# Patient Record
Sex: MALE | Race: WHITE | NOT HISPANIC OR LATINO
[De-identification: names, ages, dates, MRNs, and addresses within clinical notes are randomized per-mention and may not be internally consistent; named-entity substitution may affect disease eponyms.]

---

## 2017-03-06 ENCOUNTER — APPOINTMENT (OUTPATIENT)
Dept: NEUROLOGY | Facility: CLINIC | Age: 57
End: 2017-03-06

## 2017-05-30 ENCOUNTER — APPOINTMENT (OUTPATIENT)
Dept: NEUROLOGY | Facility: CLINIC | Age: 57
End: 2017-05-30

## 2017-05-30 DIAGNOSIS — M54.12 RADICULOPATHY, CERVICAL REGION: ICD-10-CM

## 2017-05-30 DIAGNOSIS — N40.0 BENIGN PROSTATIC HYPERPLASIA WITHOUT LOWER URINARY TRACT SYMPMS: ICD-10-CM

## 2017-05-30 DIAGNOSIS — Z78.9 OTHER SPECIFIED HEALTH STATUS: ICD-10-CM

## 2017-05-30 DIAGNOSIS — Z87.891 PERSONAL HISTORY OF NICOTINE DEPENDENCE: ICD-10-CM

## 2017-06-01 VITALS — DIASTOLIC BLOOD PRESSURE: 78 MMHG | SYSTOLIC BLOOD PRESSURE: 122 MMHG | HEART RATE: 70 BPM

## 2017-06-01 PROBLEM — Z78.9 CURRENT NON-SMOKER: Status: ACTIVE | Noted: 2017-06-01

## 2017-06-01 PROBLEM — N40.0 BENIGN PROSTATIC HYPERPLASIA: Status: RESOLVED | Noted: 2017-06-01 | Resolved: 2017-06-01

## 2017-06-01 PROBLEM — Z87.891 FORMER SMOKER: Status: ACTIVE | Noted: 2017-06-01

## 2017-06-05 ENCOUNTER — FORM ENCOUNTER (OUTPATIENT)
Age: 57
End: 2017-06-05

## 2017-06-06 ENCOUNTER — APPOINTMENT (OUTPATIENT)
Dept: MRI IMAGING | Facility: CLINIC | Age: 57
End: 2017-06-06

## 2017-06-06 ENCOUNTER — OUTPATIENT (OUTPATIENT)
Dept: OUTPATIENT SERVICES | Facility: HOSPITAL | Age: 57
LOS: 1 days | End: 2017-06-06
Payer: COMMERCIAL

## 2017-06-06 DIAGNOSIS — Z00.8 ENCOUNTER FOR OTHER GENERAL EXAMINATION: ICD-10-CM

## 2017-06-06 PROCEDURE — 72141 MRI NECK SPINE W/O DYE: CPT

## 2017-06-26 ENCOUNTER — APPOINTMENT (OUTPATIENT)
Dept: NEUROLOGY | Facility: CLINIC | Age: 57
End: 2017-06-26

## 2017-06-26 VITALS
HEART RATE: 72 BPM | WEIGHT: 176 LBS | BODY MASS INDEX: 26.07 KG/M2 | HEIGHT: 69 IN | SYSTOLIC BLOOD PRESSURE: 132 MMHG | DIASTOLIC BLOOD PRESSURE: 85 MMHG

## 2017-07-19 ENCOUNTER — APPOINTMENT (OUTPATIENT)
Dept: COLORECTAL SURGERY | Facility: CLINIC | Age: 57
End: 2017-07-19

## 2017-07-19 ENCOUNTER — APPOINTMENT (OUTPATIENT)
Dept: SURGERY | Facility: CLINIC | Age: 57
End: 2017-07-19

## 2017-07-19 VITALS
DIASTOLIC BLOOD PRESSURE: 84 MMHG | TEMPERATURE: 98.1 F | WEIGHT: 175 LBS | RESPIRATION RATE: 15 BRPM | HEART RATE: 65 BPM | OXYGEN SATURATION: 98 % | HEIGHT: 69 IN | BODY MASS INDEX: 25.92 KG/M2 | SYSTOLIC BLOOD PRESSURE: 129 MMHG

## 2017-07-19 DIAGNOSIS — N50.819 TESTICULAR PAIN, UNSPECIFIED: ICD-10-CM

## 2017-07-21 ENCOUNTER — APPOINTMENT (OUTPATIENT)
Dept: NEUROLOGY | Facility: CLINIC | Age: 57
End: 2017-07-21

## 2017-07-21 VITALS
SYSTOLIC BLOOD PRESSURE: 118 MMHG | HEART RATE: 68 BPM | HEIGHT: 69 IN | WEIGHT: 175 LBS | DIASTOLIC BLOOD PRESSURE: 82 MMHG | BODY MASS INDEX: 25.92 KG/M2

## 2017-07-21 DIAGNOSIS — E55.9 VITAMIN D DEFICIENCY, UNSPECIFIED: ICD-10-CM

## 2017-07-21 DIAGNOSIS — R42 DIZZINESS AND GIDDINESS: ICD-10-CM

## 2017-07-25 ENCOUNTER — FORM ENCOUNTER (OUTPATIENT)
Age: 57
End: 2017-07-25

## 2017-07-26 ENCOUNTER — APPOINTMENT (OUTPATIENT)
Dept: MRI IMAGING | Facility: CLINIC | Age: 57
End: 2017-07-26

## 2017-07-26 ENCOUNTER — OUTPATIENT (OUTPATIENT)
Dept: OUTPATIENT SERVICES | Facility: HOSPITAL | Age: 57
LOS: 1 days | End: 2017-07-26
Payer: COMMERCIAL

## 2017-07-26 DIAGNOSIS — Z00.8 ENCOUNTER FOR OTHER GENERAL EXAMINATION: ICD-10-CM

## 2017-07-26 PROCEDURE — 70544 MR ANGIOGRAPHY HEAD W/O DYE: CPT

## 2017-07-26 PROCEDURE — 70551 MRI BRAIN STEM W/O DYE: CPT

## 2017-09-26 ENCOUNTER — APPOINTMENT (OUTPATIENT)
Dept: NEUROLOGY | Facility: CLINIC | Age: 57
End: 2017-09-26
Payer: COMMERCIAL

## 2017-09-26 VITALS
SYSTOLIC BLOOD PRESSURE: 120 MMHG | WEIGHT: 174 LBS | HEIGHT: 69 IN | BODY MASS INDEX: 25.77 KG/M2 | DIASTOLIC BLOOD PRESSURE: 78 MMHG

## 2017-09-26 DIAGNOSIS — R20.9 UNSPECIFIED DISTURBANCES OF SKIN SENSATION: ICD-10-CM

## 2017-09-26 DIAGNOSIS — R42 DIZZINESS AND GIDDINESS: ICD-10-CM

## 2017-09-26 DIAGNOSIS — G56.03 CARPAL TUNNEL SYNDROM,BILATERAL UPPER LIMBS: ICD-10-CM

## 2017-09-26 PROCEDURE — 99214 OFFICE O/P EST MOD 30 MIN: CPT

## 2018-09-25 ENCOUNTER — APPOINTMENT (OUTPATIENT)
Dept: COLORECTAL SURGERY | Facility: CLINIC | Age: 58
End: 2018-09-25
Payer: COMMERCIAL

## 2018-09-25 VITALS
HEART RATE: 90 BPM | RESPIRATION RATE: 14 BRPM | HEIGHT: 69 IN | SYSTOLIC BLOOD PRESSURE: 122 MMHG | WEIGHT: 184 LBS | DIASTOLIC BLOOD PRESSURE: 85 MMHG | BODY MASS INDEX: 27.25 KG/M2

## 2018-09-25 DIAGNOSIS — K64.8 OTHER HEMORRHOIDS: ICD-10-CM

## 2018-09-25 DIAGNOSIS — F41.9 ANXIETY DISORDER, UNSPECIFIED: ICD-10-CM

## 2018-09-25 DIAGNOSIS — Z86.010 PERSONAL HISTORY OF COLONIC POLYPS: ICD-10-CM

## 2018-09-25 PROCEDURE — 99243 OFF/OP CNSLTJ NEW/EST LOW 30: CPT

## 2018-10-04 ENCOUNTER — APPOINTMENT (OUTPATIENT)
Dept: COLORECTAL SURGERY | Facility: CLINIC | Age: 58
End: 2018-10-04
Payer: COMMERCIAL

## 2018-10-04 DIAGNOSIS — K63.5 POLYP OF COLON: ICD-10-CM

## 2018-10-04 DIAGNOSIS — K57.90 DIVERTICULOSIS OF INTESTINE, PART UNSPECIFIED, W/OUT PERFORATION OR ABSCESS W/OUT BLEEDING: ICD-10-CM

## 2018-10-04 DIAGNOSIS — K62.5 HEMORRHAGE OF ANUS AND RECTUM: ICD-10-CM

## 2018-10-04 DIAGNOSIS — K64.9 UNSPECIFIED HEMORRHOIDS: ICD-10-CM

## 2018-10-04 PROCEDURE — 45380 COLONOSCOPY AND BIOPSY: CPT

## 2018-10-04 PROCEDURE — 46221 LIGATION OF HEMORRHOID(S): CPT

## 2018-11-08 LAB — CORE LAB BIOPSY: NORMAL

## 2019-07-31 ENCOUNTER — APPOINTMENT (OUTPATIENT)
Dept: COLORECTAL SURGERY | Facility: CLINIC | Age: 59
End: 2019-07-31
Payer: COMMERCIAL

## 2019-07-31 PROCEDURE — 46221 LIGATION OF HEMORRHOID(S): CPT

## 2019-07-31 NOTE — HISTORY OF PRESENT ILLNESS
[FreeTextEntry1] : Pleasant 59-year-old gentleman who is well known to me. I have treated in the past for bleeding internal hemorrhoids with good result. He had a repeat colonoscopy by my associate and performed 2018 and a small benign polyp was removed. He also reports an additional band being placed.\par \par He presented with recurrent, painless intermittent bright red rectal bleeding.\par \par The patient was examined in left lateral decubitus position. An anoscope was introduced and a right anterior hemorrhoid was isolated. This was deployed without difficulty.\par \par I will see him in 3 weeks for second banding
(0) understands/communicates without difficulty

## 2019-08-28 ENCOUNTER — APPOINTMENT (OUTPATIENT)
Dept: COLORECTAL SURGERY | Facility: CLINIC | Age: 59
End: 2019-08-28
Payer: COMMERCIAL

## 2019-08-28 PROCEDURE — 46221 LIGATION OF HEMORRHOID(S): CPT

## 2019-08-28 NOTE — HISTORY OF PRESENT ILLNESS
[FreeTextEntry1] : Patient presents for his second hemorrhoidal banding. He has had no further bleeding since his last banding.\par \par The patient was examined in the left lateral  decubitus position. An anoscope was introduced and a left lateral hemorrhoid isolated. A band  was deployed without difficulty.\par \par I will see him in 3 weeks.

## 2023-07-26 ENCOUNTER — APPOINTMENT (OUTPATIENT)
Dept: COLORECTAL SURGERY | Facility: CLINIC | Age: 63
End: 2023-07-26
Payer: MEDICAID

## 2023-07-26 VITALS
HEIGHT: 69 IN | TEMPERATURE: 97.5 F | HEART RATE: 54 BPM | SYSTOLIC BLOOD PRESSURE: 150 MMHG | WEIGHT: 180 LBS | RESPIRATION RATE: 12 BRPM | DIASTOLIC BLOOD PRESSURE: 80 MMHG | BODY MASS INDEX: 26.66 KG/M2

## 2023-07-26 PROCEDURE — 99203 OFFICE O/P NEW LOW 30 MIN: CPT

## 2023-07-26 RX ORDER — CHROMIUM 200 MCG
TABLET ORAL
Refills: 0 | Status: DISCONTINUED | COMMUNITY
End: 2023-07-26

## 2023-07-26 NOTE — HISTORY OF PRESENT ILLNESS
[FreeTextEntry1] : 64yo WM with hx IH, s/p RBL (last 2019). Last colonoscopy 2018\par \par Approx 2 weeks ago became aware of painless perianal tissue swelling. Denies rectal bleeding. Unable to reduce tissue. Presents for evaluation.\par \par Formed BM 2-3x daily

## 2023-07-26 NOTE — REVIEW OF SYSTEMS
[As Noted in HPI] : as noted in HPI [Negative] : Endocrine [Chest Pain] : no chest pain [Shortness Of Breath] : no shortness of breath [Easy Bleeding] : no tendency for easy bleeding [Easy Bruising] : no tendency for easy bruising [FreeTextEntry9] : recent neck discomfort

## 2023-07-26 NOTE — ASSESSMENT
[FreeTextEntry1] : Very pleasant 63-year-old gentleman who presents with a chief complaint of a perianal lump.\par \par Approximately 2 weeks ago after taking a shower the patient noticed an abnormal lump in the perianal region.  It was not significantly painful and he denies rectal bleeding.  The patient has undergone hemorrhoidal banding in the past for bleeding internal hemorrhoids.  His last colonoscopy was 5 years ago.  As an aside, he had a very large colonic polyp removed colonoscopically in his 30s.\par \par Inspection of the perianal region reveals an obvious thrombosis in the left lateral position.  The skin overlying the thrombosis is intact.  The area is nontender and he denies bleeding.\par \par I explained to him the pathology and this will hopefully simply resolve on its own.  I have advised him to use locally applied over-the-counter steroid creams.  I also explained to him that he is due for colorectal cancer screening and I have asked him to schedule a colonoscopy in 1 to 2 months.

## 2023-07-26 NOTE — PHYSICAL EXAM
[Normal Breath Sounds] : Normal breath sounds [Normal Heart Sounds] : normal heart sounds [Normal Rate and Rhythm] : normal rate and rhythm [No Edema] : No edema [Alert] : alert [Oriented to Person] : oriented to person [Oriented to Place] : oriented to place [Oriented to Time] : oriented to time [Calm] : calm [de-identified] : round soft +BS NT/ND [de-identified] : NC/AT [de-identified] : +ROM [de-identified] : intact

## 2023-09-23 ENCOUNTER — EMERGENCY (EMERGENCY)
Facility: HOSPITAL | Age: 63
LOS: 1 days | Discharge: HOME | End: 2023-09-23
Attending: EMERGENCY MEDICINE
Payer: MEDICAID

## 2023-09-23 VITALS
SYSTOLIC BLOOD PRESSURE: 149 MMHG | HEART RATE: 68 BPM | TEMPERATURE: 98 F | DIASTOLIC BLOOD PRESSURE: 89 MMHG | HEIGHT: 69 IN | RESPIRATION RATE: 16 BRPM | OXYGEN SATURATION: 99 % | WEIGHT: 184.97 LBS

## 2023-09-23 VITALS
DIASTOLIC BLOOD PRESSURE: 86 MMHG | SYSTOLIC BLOOD PRESSURE: 158 MMHG | HEART RATE: 84 BPM | TEMPERATURE: 97 F | RESPIRATION RATE: 18 BRPM | OXYGEN SATURATION: 98 %

## 2023-09-23 LAB
ALBUMIN SERPL ELPH-MCNC: 4.4 G/DL — SIGNIFICANT CHANGE UP (ref 3.3–5)
ALP SERPL-CCNC: 64 U/L — SIGNIFICANT CHANGE UP (ref 40–120)
ALT FLD-CCNC: 39 U/L — SIGNIFICANT CHANGE UP (ref 10–45)
ANION GAP SERPL CALC-SCNC: 16 MMOL/L — SIGNIFICANT CHANGE UP (ref 5–17)
APPEARANCE UR: CLEAR — SIGNIFICANT CHANGE UP
APTT BLD: 26.5 SEC — SIGNIFICANT CHANGE UP (ref 24.5–35.6)
AST SERPL-CCNC: 31 U/L — SIGNIFICANT CHANGE UP (ref 10–40)
BACTERIA # UR AUTO: NEGATIVE — SIGNIFICANT CHANGE UP
BASE EXCESS BLDV CALC-SCNC: -1.2 MMOL/L — SIGNIFICANT CHANGE UP (ref -2–3)
BASE EXCESS BLDV CALC-SCNC: 0.2 MMOL/L — SIGNIFICANT CHANGE UP (ref -2–3)
BASE EXCESS BLDV CALC-SCNC: 0.6 MMOL/L — SIGNIFICANT CHANGE UP (ref -2–3)
BASOPHILS # BLD AUTO: 0.06 K/UL — SIGNIFICANT CHANGE UP (ref 0–0.2)
BASOPHILS NFR BLD AUTO: 0.5 % — SIGNIFICANT CHANGE UP (ref 0–2)
BILIRUB SERPL-MCNC: 0.6 MG/DL — SIGNIFICANT CHANGE UP (ref 0.2–1.2)
BILIRUB UR-MCNC: NEGATIVE — SIGNIFICANT CHANGE UP
BUN SERPL-MCNC: 15 MG/DL — SIGNIFICANT CHANGE UP (ref 7–23)
CA-I SERPL-SCNC: 1.13 MMOL/L — LOW (ref 1.15–1.33)
CA-I SERPL-SCNC: 1.15 MMOL/L — SIGNIFICANT CHANGE UP (ref 1.15–1.33)
CA-I SERPL-SCNC: 1.18 MMOL/L — SIGNIFICANT CHANGE UP (ref 1.15–1.33)
CALCIUM SERPL-MCNC: 9.2 MG/DL — SIGNIFICANT CHANGE UP (ref 8.4–10.5)
CHLORIDE BLDV-SCNC: 103 MMOL/L — SIGNIFICANT CHANGE UP (ref 96–108)
CHLORIDE BLDV-SCNC: 105 MMOL/L — SIGNIFICANT CHANGE UP (ref 96–108)
CHLORIDE BLDV-SCNC: 107 MMOL/L — SIGNIFICANT CHANGE UP (ref 96–108)
CHLORIDE SERPL-SCNC: 104 MMOL/L — SIGNIFICANT CHANGE UP (ref 96–108)
CO2 BLDV-SCNC: 25 MMOL/L — SIGNIFICANT CHANGE UP (ref 22–26)
CO2 BLDV-SCNC: 26 MMOL/L — SIGNIFICANT CHANGE UP (ref 22–26)
CO2 BLDV-SCNC: 29 MMOL/L — HIGH (ref 22–26)
CO2 SERPL-SCNC: 19 MMOL/L — LOW (ref 22–31)
COLOR SPEC: COLORLESS — SIGNIFICANT CHANGE UP
CREAT SERPL-MCNC: 1 MG/DL — SIGNIFICANT CHANGE UP (ref 0.5–1.3)
DIFF PNL FLD: ABNORMAL
EGFR: 86 ML/MIN/1.73M2 — SIGNIFICANT CHANGE UP
EOSINOPHIL # BLD AUTO: 0.05 K/UL — SIGNIFICANT CHANGE UP (ref 0–0.5)
EOSINOPHIL NFR BLD AUTO: 0.4 % — SIGNIFICANT CHANGE UP (ref 0–6)
EPI CELLS # UR: 0 /HPF — SIGNIFICANT CHANGE UP
GAS PNL BLDV: 133 MMOL/L — LOW (ref 136–145)
GAS PNL BLDV: 134 MMOL/L — LOW (ref 136–145)
GAS PNL BLDV: 136 MMOL/L — SIGNIFICANT CHANGE UP (ref 136–145)
GAS PNL BLDV: SIGNIFICANT CHANGE UP
GLUCOSE BLDV-MCNC: 153 MG/DL — HIGH (ref 70–99)
GLUCOSE BLDV-MCNC: 159 MG/DL — HIGH (ref 70–99)
GLUCOSE BLDV-MCNC: 88 MG/DL — SIGNIFICANT CHANGE UP (ref 70–99)
GLUCOSE SERPL-MCNC: 146 MG/DL — HIGH (ref 70–99)
GLUCOSE UR QL: NEGATIVE — SIGNIFICANT CHANGE UP
HCO3 BLDV-SCNC: 24 MMOL/L — SIGNIFICANT CHANGE UP (ref 22–29)
HCO3 BLDV-SCNC: 25 MMOL/L — SIGNIFICANT CHANGE UP (ref 22–29)
HCO3 BLDV-SCNC: 27 MMOL/L — SIGNIFICANT CHANGE UP (ref 22–29)
HCT VFR BLD CALC: 47.6 % — SIGNIFICANT CHANGE UP (ref 39–50)
HCT VFR BLDA CALC: 46 % — SIGNIFICANT CHANGE UP (ref 39–51)
HCT VFR BLDA CALC: 48 % — SIGNIFICANT CHANGE UP (ref 39–51)
HCT VFR BLDA CALC: 48 % — SIGNIFICANT CHANGE UP (ref 39–51)
HGB BLD CALC-MCNC: 15.3 G/DL — SIGNIFICANT CHANGE UP (ref 12.6–17.4)
HGB BLD CALC-MCNC: 15.9 G/DL — SIGNIFICANT CHANGE UP (ref 12.6–17.4)
HGB BLD CALC-MCNC: 16 G/DL — SIGNIFICANT CHANGE UP (ref 12.6–17.4)
HGB BLD-MCNC: 15.9 G/DL — SIGNIFICANT CHANGE UP (ref 13–17)
IMM GRANULOCYTES NFR BLD AUTO: 0.5 % — SIGNIFICANT CHANGE UP (ref 0–0.9)
INR BLD: 0.93 RATIO — SIGNIFICANT CHANGE UP (ref 0.85–1.18)
KETONES UR-MCNC: NEGATIVE — SIGNIFICANT CHANGE UP
LACTATE BLDV-MCNC: 1.8 MMOL/L — SIGNIFICANT CHANGE UP (ref 0.5–2)
LACTATE BLDV-MCNC: 2.7 MMOL/L — HIGH (ref 0.5–2)
LACTATE BLDV-MCNC: 4 MMOL/L — CRITICAL HIGH (ref 0.5–2)
LEUKOCYTE ESTERASE UR-ACNC: NEGATIVE — SIGNIFICANT CHANGE UP
LIDOCAIN IGE QN: 25 U/L — SIGNIFICANT CHANGE UP (ref 7–60)
LYMPHOCYTES # BLD AUTO: 1.03 K/UL — SIGNIFICANT CHANGE UP (ref 1–3.3)
LYMPHOCYTES # BLD AUTO: 8.4 % — LOW (ref 13–44)
MCHC RBC-ENTMCNC: 29.3 PG — SIGNIFICANT CHANGE UP (ref 27–34)
MCHC RBC-ENTMCNC: 33.4 GM/DL — SIGNIFICANT CHANGE UP (ref 32–36)
MCV RBC AUTO: 87.7 FL — SIGNIFICANT CHANGE UP (ref 80–100)
MONOCYTES # BLD AUTO: 0.72 K/UL — SIGNIFICANT CHANGE UP (ref 0–0.9)
MONOCYTES NFR BLD AUTO: 5.9 % — SIGNIFICANT CHANGE UP (ref 2–14)
NEUTROPHILS # BLD AUTO: 10.29 K/UL — HIGH (ref 1.8–7.4)
NEUTROPHILS NFR BLD AUTO: 84.3 % — HIGH (ref 43–77)
NITRITE UR-MCNC: NEGATIVE — SIGNIFICANT CHANGE UP
NRBC # BLD: 0 /100 WBCS — SIGNIFICANT CHANGE UP (ref 0–0)
PCO2 BLDV: 39 MMHG — LOW (ref 42–55)
PCO2 BLDV: 39 MMHG — LOW (ref 42–55)
PCO2 BLDV: 49 MMHG — SIGNIFICANT CHANGE UP (ref 42–55)
PH BLDV: 7.35 — SIGNIFICANT CHANGE UP (ref 7.32–7.43)
PH BLDV: 7.39 — SIGNIFICANT CHANGE UP (ref 7.32–7.43)
PH BLDV: 7.41 — SIGNIFICANT CHANGE UP (ref 7.32–7.43)
PH UR: 8 — SIGNIFICANT CHANGE UP (ref 5–8)
PLATELET # BLD AUTO: 245 K/UL — SIGNIFICANT CHANGE UP (ref 150–400)
PO2 BLDV: 40 MMHG — SIGNIFICANT CHANGE UP (ref 25–45)
PO2 BLDV: 42 MMHG — SIGNIFICANT CHANGE UP (ref 25–45)
PO2 BLDV: 59 MMHG — HIGH (ref 25–45)
POTASSIUM BLDV-SCNC: 4 MMOL/L — SIGNIFICANT CHANGE UP (ref 3.5–5.1)
POTASSIUM BLDV-SCNC: 4 MMOL/L — SIGNIFICANT CHANGE UP (ref 3.5–5.1)
POTASSIUM BLDV-SCNC: 4.1 MMOL/L — SIGNIFICANT CHANGE UP (ref 3.5–5.1)
POTASSIUM SERPL-MCNC: 4.2 MMOL/L — SIGNIFICANT CHANGE UP (ref 3.5–5.3)
POTASSIUM SERPL-SCNC: 4.2 MMOL/L — SIGNIFICANT CHANGE UP (ref 3.5–5.3)
PROT SERPL-MCNC: 7.1 G/DL — SIGNIFICANT CHANGE UP (ref 6–8.3)
PROT UR-MCNC: NEGATIVE — SIGNIFICANT CHANGE UP
PROTHROM AB SERPL-ACNC: 10.3 SEC — SIGNIFICANT CHANGE UP (ref 9.5–13)
RBC # BLD: 5.43 M/UL — SIGNIFICANT CHANGE UP (ref 4.2–5.8)
RBC # FLD: 13 % — SIGNIFICANT CHANGE UP (ref 10.3–14.5)
RBC CASTS # UR COMP ASSIST: 7 /HPF — HIGH (ref 0–4)
SAO2 % BLDV: 68 % — SIGNIFICANT CHANGE UP (ref 67–88)
SAO2 % BLDV: 72.2 % — SIGNIFICANT CHANGE UP (ref 67–88)
SAO2 % BLDV: 92.9 % — HIGH (ref 67–88)
SODIUM SERPL-SCNC: 139 MMOL/L — SIGNIFICANT CHANGE UP (ref 135–145)
SP GR SPEC: 1.04 — HIGH (ref 1.01–1.02)
TROPONIN T, HIGH SENSITIVITY RESULT: <6 NG/L — SIGNIFICANT CHANGE UP (ref 0–51)
UROBILINOGEN FLD QL: NEGATIVE — SIGNIFICANT CHANGE UP
WBC # BLD: 12.21 K/UL — HIGH (ref 3.8–10.5)
WBC # FLD AUTO: 12.21 K/UL — HIGH (ref 3.8–10.5)
WBC UR QL: 0 /HPF — SIGNIFICANT CHANGE UP (ref 0–5)

## 2023-09-23 PROCEDURE — 81001 URINALYSIS AUTO W/SCOPE: CPT

## 2023-09-23 PROCEDURE — 85014 HEMATOCRIT: CPT

## 2023-09-23 PROCEDURE — 75635 CT ANGIO ABDOMINAL ARTERIES: CPT | Mod: MA

## 2023-09-23 PROCEDURE — 82435 ASSAY OF BLOOD CHLORIDE: CPT

## 2023-09-23 PROCEDURE — 85025 COMPLETE CBC W/AUTO DIFF WBC: CPT

## 2023-09-23 PROCEDURE — 85730 THROMBOPLASTIN TIME PARTIAL: CPT

## 2023-09-23 PROCEDURE — 96375 TX/PRO/DX INJ NEW DRUG ADDON: CPT | Mod: XU

## 2023-09-23 PROCEDURE — 99285 EMERGENCY DEPT VISIT HI MDM: CPT | Mod: 25

## 2023-09-23 PROCEDURE — 82330 ASSAY OF CALCIUM: CPT

## 2023-09-23 PROCEDURE — 83605 ASSAY OF LACTIC ACID: CPT

## 2023-09-23 PROCEDURE — 80053 COMPREHEN METABOLIC PANEL: CPT

## 2023-09-23 PROCEDURE — 85610 PROTHROMBIN TIME: CPT

## 2023-09-23 PROCEDURE — 84295 ASSAY OF SERUM SODIUM: CPT

## 2023-09-23 PROCEDURE — 71275 CT ANGIOGRAPHY CHEST: CPT | Mod: 26,MA

## 2023-09-23 PROCEDURE — 93005 ELECTROCARDIOGRAM TRACING: CPT

## 2023-09-23 PROCEDURE — 82803 BLOOD GASES ANY COMBINATION: CPT

## 2023-09-23 PROCEDURE — 87086 URINE CULTURE/COLONY COUNT: CPT

## 2023-09-23 PROCEDURE — 99285 EMERGENCY DEPT VISIT HI MDM: CPT

## 2023-09-23 PROCEDURE — 96365 THER/PROPH/DIAG IV INF INIT: CPT | Mod: XU

## 2023-09-23 PROCEDURE — 84132 ASSAY OF SERUM POTASSIUM: CPT

## 2023-09-23 PROCEDURE — 82947 ASSAY GLUCOSE BLOOD QUANT: CPT

## 2023-09-23 PROCEDURE — 75635 CT ANGIO ABDOMINAL ARTERIES: CPT | Mod: 26,MA

## 2023-09-23 PROCEDURE — 83690 ASSAY OF LIPASE: CPT

## 2023-09-23 PROCEDURE — 96366 THER/PROPH/DIAG IV INF ADDON: CPT

## 2023-09-23 PROCEDURE — 84484 ASSAY OF TROPONIN QUANT: CPT

## 2023-09-23 PROCEDURE — 85018 HEMOGLOBIN: CPT

## 2023-09-23 PROCEDURE — 71275 CT ANGIOGRAPHY CHEST: CPT | Mod: MA

## 2023-09-23 RX ORDER — SODIUM CHLORIDE 9 MG/ML
1000 INJECTION INTRAMUSCULAR; INTRAVENOUS; SUBCUTANEOUS ONCE
Refills: 0 | Status: COMPLETED | OUTPATIENT
Start: 2023-09-23 | End: 2023-09-23

## 2023-09-23 RX ORDER — MORPHINE SULFATE 50 MG/1
4 CAPSULE, EXTENDED RELEASE ORAL ONCE
Refills: 0 | Status: DISCONTINUED | OUTPATIENT
Start: 2023-09-23 | End: 2023-09-23

## 2023-09-23 RX ORDER — TAMSULOSIN HYDROCHLORIDE 0.4 MG/1
0.4 CAPSULE ORAL AT BEDTIME
Refills: 0 | Status: DISCONTINUED | OUTPATIENT
Start: 2023-09-23 | End: 2023-09-27

## 2023-09-23 RX ORDER — ACETAMINOPHEN 500 MG
1000 TABLET ORAL ONCE
Refills: 0 | Status: COMPLETED | OUTPATIENT
Start: 2023-09-23 | End: 2023-09-23

## 2023-09-23 RX ORDER — KETOROLAC TROMETHAMINE 30 MG/ML
15 SYRINGE (ML) INJECTION ONCE
Refills: 0 | Status: DISCONTINUED | OUTPATIENT
Start: 2023-09-23 | End: 2023-09-23

## 2023-09-23 RX ORDER — ACETAMINOPHEN 500 MG
975 TABLET ORAL ONCE
Refills: 0 | Status: COMPLETED | OUTPATIENT
Start: 2023-09-23 | End: 2023-09-23

## 2023-09-23 RX ORDER — TAMSULOSIN HYDROCHLORIDE 0.4 MG/1
1 CAPSULE ORAL
Qty: 30 | Refills: 0
Start: 2023-09-23 | End: 2023-10-22

## 2023-09-23 RX ADMIN — SODIUM CHLORIDE 1000 MILLILITER(S): 9 INJECTION INTRAMUSCULAR; INTRAVENOUS; SUBCUTANEOUS at 20:06

## 2023-09-23 RX ADMIN — Medication 1000 MILLIGRAM(S): at 20:06

## 2023-09-23 RX ADMIN — Medication 400 MILLIGRAM(S): at 13:00

## 2023-09-23 RX ADMIN — Medication 15 MILLIGRAM(S): at 20:20

## 2023-09-23 RX ADMIN — MORPHINE SULFATE 4 MILLIGRAM(S): 50 CAPSULE, EXTENDED RELEASE ORAL at 13:00

## 2023-09-23 RX ADMIN — MORPHINE SULFATE 4 MILLIGRAM(S): 50 CAPSULE, EXTENDED RELEASE ORAL at 20:06

## 2023-09-23 RX ADMIN — TAMSULOSIN HYDROCHLORIDE 0.4 MILLIGRAM(S): 0.4 CAPSULE ORAL at 15:50

## 2023-09-23 RX ADMIN — SODIUM CHLORIDE 1000 MILLILITER(S): 9 INJECTION INTRAMUSCULAR; INTRAVENOUS; SUBCUTANEOUS at 17:49

## 2023-09-23 RX ADMIN — Medication 975 MILLIGRAM(S): at 20:20

## 2023-09-23 RX ADMIN — SODIUM CHLORIDE 1000 MILLILITER(S): 9 INJECTION INTRAMUSCULAR; INTRAVENOUS; SUBCUTANEOUS at 14:30
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
## 2023-09-23 NOTE — ED PROVIDER NOTE - PATIENT PORTAL LINK FT
You can access the FollowMyHealth Patient Portal offered by [facility] by registering at the following website: [email or web address]. By joining [employer]’s FollowMyHealth portal, you will also be able to view your health information using other applications (apps) compatible with our system.

## 2023-09-23 NOTE — ED PROVIDER NOTE - CLINICAL SUMMARY MEDICAL DECISION MAKING FREE TEXT BOX
63-year-old male with past medical history of colon cancer x30 years ago presents emergency department with 2 hours of right abd pain, constant, sharp going to R back, 1 episode of sharp chest pain, diaphoresis, sweating, SOB,  which has resolved. Non tender abd. concern for AD vs nephrolithiasis vs other acute abd pathology. will get labs, CTA's, meds and re-eval.

## 2023-09-23 NOTE — ED PROVIDER NOTE - ATTENDING CONTRIBUTION TO CARE
attending Mara: 63yM nonsmoker h/o colon cancer (remote) p/w 2 hours R flank pain, sharp, sudden onset with associated episode of chest pain. Chest pain since resolved. Denies similar episodes in the past. No h/o kidney stones. Denies fever, vomiting, urinary symptoms. Exam as above. Differential diagnosis including, but not limited to, kidney stone, perforated viscous, aortic dissection, AAA. Will obtain labs, UA/Ucx, CT imaging, pain control, reassess

## 2023-09-23 NOTE — ED ADULT NURSE REASSESSMENT NOTE - NS ED NURSE REASSESS COMMENT FT1
Received report from CHRIS Nevarez RN. Patient presenting with abdominal pain, bladder stone noted on imaging. Patient notes recurrent pain 2/10 in severity after attempting to use restroom. MD Long aware of pain. Patient stable for discharge per MD. Pending discharge.

## 2023-09-23 NOTE — CONSULT NOTE ADULT - SUBJECTIVE AND OBJECTIVE BOX
HPI  63-year-old male with past medical history of colon cancer x30 years ago presents emergency department with 2 hours of right abd pain, constant, sharp going to R back, 1 episode of sharp chest pain, diaphoresis, sweating, SOB,  which has resolved.  Patient denies previous history of cardiac pathology.  Patient states he started cardiologist multiple years ago.  Patient denies previous history of nephrolithiasis. No systemic s/s.    Urology consulted for 2 cm bladder stone. Patient denies dysuria, gross hematuria, flank pain. Sometimes reports incomplete emptying, nocturia x2, weak stream. No other urologic complaints at this time. Tried Flomax approximately 10 years ago but didn't enjoy side effects. Had Urolift procedure in past.    PAST MEDICAL & SURGICAL HISTORY:      MEDICATIONS  (STANDING):  tamsulosin 0.4 milliGRAM(s) Oral at bedtime    MEDICATIONS  (PRN):      FAMILY HISTORY:      Allergies    No Known Allergies    Intolerances        SOCIAL HISTORY:    REVIEW OF SYSTEMS:   Otherwise negative as stated in HPI    Physical Exam  Vital signs  T(C): 36.4 (23 @ 12:08), Max: 36.4 (23 @ 12:08)  HR: 75 (23 @ 14:27)  BP: 157/98 (23 @ 14:27)  SpO2: 98% (23 @ 14:27)  Wt(kg): --    Output      Gen: NAD  Pulm: No respiratory distress  Abd: Soft, nontender, nondistended  : Voiding spontaneously        LABS:       @ 13:08    WBC 12.21 / Hct 47.6  / SCr 1.00         139  |  104  |  15  ----------------------------<  146<H>  4.2   |  19<L>  |  1.00    Ca    9.2      23 Sep 2023 13:08    TPro  7.1  /  Alb  4.4  /  TBili  0.6  /  DBili  x   /  AST  31  /  ALT  39  /  AlkPhos  64      PT/INR - ( 23 Sep 2023 13:08 )   PT: 10.3 sec;   INR: 0.93 ratio         PTT - ( 23 Sep 2023 13:08 )  PTT:26.5 sec  Urinalysis Basic - ( 23 Sep 2023 14:27 )    Color: Colorless / Appearance: Clear / S.043 / pH: x  Gluc: x / Ketone: Negative  / Bili: Negative / Urobili: Negative   Blood: x / Protein: Negative / Nitrite: Negative   Leuk Esterase: Negative / RBC: 7 /hpf / WBC 0 /HPF   Sq Epi: x / Non Sq Epi: x / Bacteria: Negative        Urine Cx: Pending      RADIOLOGY:      ACC: [id number] EXAM: CT ANGIO ABD AOR W RUN(W)AW IC ORDERED BY: PATRICIA PIMENTEL    PROCEDURE DATE: 2023        INTERPRETATION: CLINICAL INFORMATION: CP, abd pain. Evaluate for dissection.    COMPARISON: None.    CONTRAST/COMPLICATIONS:  IV Contrast: IV contrast documented in unlinked concurrent exam  Oral Contrast: NONE  Complications: None reported at time of study completion    PROCEDURE:  CT Angiography of the Chest, Abdomen and Pelvis.  Precontrast imaging was performed through the chest followed by arterial phase imaging of the chest, abdomen and pelvis.  Sagittal and coronal reformats were performed as well as 3D (MIP) reconstructions.    FINDINGS:  CHEST:  LUNGS AND LARGE AIRWAYS: Patent central airways. No pulmonary nodules.  Minimal bibasilar subsegmental atelectasis.  PLEURA: No pleural effusion.  VESSELS: No thoracic aneurysm or dissection. No acute aortic syndrome. Study was not performed or optimized for evaluation of PE.  HEART: Heart size is normal. No pericardial effusion. Coronary artery calcification present. MEDIASTINUM AND PEDRO: No lymphadenopathy.  CHEST WALL AND LOWER NECK: Within normal limits.    ABDOMEN AND PELVIS:  LIVER: Steatosis.  BILE DUCTS: Normal caliber.  GALLBLADDER: Within normal limits.  SPLEEN: Within normal limits.  PANCREAS: Within normal limits.  ADRENALS: Within normal limits.  KIDNEYS/URETERS: Within normal limits.    BLADDER: 2 cm stone present within the dependent portion of urinary bladder.  REPRODUCTIVE ORGANS: Fiducial markers within the prostate gland. Prostate measures 4.4 x 3.5 cm.    Diverticulosis without acute diverticulitis. No colitis.    BOWEL: No bowel obstruction. Appendix is normal.  PERITONEUM: No ascites.  No free air or abscess.  VESSELS: Atherosclerotic changes. No abdominal aortic aneurysm or dissection.  RETROPERITONEUM/LYMPH NODES: No lymphadenopathy.  ABDOMINAL WALL: 1.3 cm fat-containing umbilical hernia.  BONES: Degenerative changes. No lytic or blastic process.    IMPRESSION:  No thoracic/abdominal aortic aortic aneurysm or dissection. No acute aortic syndrome.    Cholelithiasis. No biliary ductal dilatation.    2 cm stone present within the dependent portion of urinary bladder.    Please refer to detailed findings otherwise described above.

## 2023-09-23 NOTE — ED PROVIDER NOTE - OBJECTIVE STATEMENT
63-year-old male with past medical history of colon cancer x30 years ago presents emergency department with 2 hours of right abd pain, constant, sharp going to R back, 1 episode of sharp chest pain, diaphoresis, sweating, SOB,  which has resolved.  Patient denies previous history of cardiac pathology.  Patient states he started cardiologist multiple years ago.  Patient denies previous history of nephrolithiasis. No systemic s/s.

## 2023-09-23 NOTE — ED PROVIDER NOTE - NSFOLLOWUPINSTRUCTIONS_ED_ALL_ED_FT
Kidney Stones    Kidney stones (urolithiasis) are crystal deposits that form inside your kidneys. Pain is caused by the stone moving through the urinary tract, causing spasms of the ureter. Drink enough water and fluids to keep your urine clear or pale yellow. This will help you to pass the stone or stone fragments. If provided a strainer, strain all urine and keep all particulate matter and stones for a follow up appointment with a urologist.    SEEK IMMEDIATE MEDICAL CARE IF YOU HAVE ANY OF THE FOLLOWING SYMPTOMS: pain not controlled with medication, fever/chills, worsening vomiting, inability to urinate, or dizziness/lightheadedness.    please follow up with urology within the week. you stated you will make your own urology appointment.     please take flomax as indicated. you were seen here today for a bladder stone.     while here you had imaging, lab work, urology consult.    SEEK IMMEDIATE MEDICAL CARE IF YOU HAVE ANY OF THE FOLLOWING SYMPTOMS: pain not controlled with medication, fever/chills, worsening vomiting, inability to urinate, or dizziness/lightheadedness.    please follow up with urology within the week. you stated you will make your own urology appointment. you may follow up with Dr. Shellie Allen [place] [facility]  [address] [place] [address]  [phone]    please take flomax as indicated.

## 2023-09-23 NOTE — ED PROVIDER NOTE - PHYSICAL EXAMINATION
PHYSICAL EXAM:  CONSTITUTIONAL: appearing, awake, alert, oriented to person, place, time/situation, pacing, unable to find a position of comfort, pale.   HEAD: Atraumatic  EYES: Clear bilaterally, pupils equal, round and reactive to light.  ENMT: Airway patent, Nasal mucosa clear. Mouth with normal mucosa. Uvula is midline.   CARDIAC: Normal rate, regular rhythm. +S1/S2. No murmurs, rubs or gallops.  RESPIRATORY: Breathing unlabored. Breath sounds clear and equal bilaterally.  ABDOMEN:  Soft, nontender, nondistended. No rebound tenderness or guarding. No CVA tenderness.   NEUROLOGICAL: Alert and oriented, no focal deficits, no motor or sensory deficits. Sensation intact x4 extremities.  SKIN: Skin warm and dry. No evidence of rashes or lesions.

## 2023-09-23 NOTE — CONSULT NOTE ADULT - ASSESSMENT
63M presenting with chest pain and found to have 2 cm bladder stone.    - No acute urologic intervention at this time  - Follow up urine culture  -  mL, which is acceptable given bladder stone and chronic incomplete emptying  - Recommend starting flomax to aid in bladder emptying  - Patient should follow up outpatient with Dr. Shellie Allen [place] [facility]  [address] [place] [id number] [phone]    Case discussed with Dr. Stanford

## 2023-09-24 LAB
CULTURE RESULTS: NO GROWTH — SIGNIFICANT CHANGE UP
SPECIMEN SOURCE: SIGNIFICANT CHANGE UP

## 2023-09-25 ENCOUNTER — APPOINTMENT (OUTPATIENT)
Dept: UROLOGY | Facility: CLINIC | Age: 63
End: 2023-09-25
Payer: MEDICAID

## 2023-09-25 VITALS
HEIGHT: 69 IN | HEART RATE: 73 BPM | DIASTOLIC BLOOD PRESSURE: 92 MMHG | BODY MASS INDEX: 27.99 KG/M2 | WEIGHT: 189 LBS | RESPIRATION RATE: 17 BRPM | TEMPERATURE: 98.1 F | SYSTOLIC BLOOD PRESSURE: 131 MMHG

## 2023-09-25 PROCEDURE — 99205 OFFICE O/P NEW HI 60 MIN: CPT

## 2023-09-25 RX ORDER — ZALEPLON 10 MG/1
10 CAPSULE ORAL
Refills: 0 | Status: ACTIVE | COMMUNITY

## 2023-09-25 RX ORDER — ESCITALOPRAM OXALATE 5 MG/1
5 TABLET ORAL
Refills: 0 | Status: ACTIVE | COMMUNITY

## 2023-09-29 ENCOUNTER — OUTPATIENT (OUTPATIENT)
Dept: OUTPATIENT SERVICES | Facility: HOSPITAL | Age: 63
LOS: 1 days | End: 2023-09-29
Payer: MEDICAID

## 2023-09-29 VITALS
DIASTOLIC BLOOD PRESSURE: 88 MMHG | HEIGHT: 69 IN | SYSTOLIC BLOOD PRESSURE: 132 MMHG | RESPIRATION RATE: 16 BRPM | TEMPERATURE: 97 F | WEIGHT: 186.07 LBS | OXYGEN SATURATION: 97 % | HEART RATE: 76 BPM

## 2023-09-29 DIAGNOSIS — Z98.890 OTHER SPECIFIED POSTPROCEDURAL STATES: Chronic | ICD-10-CM

## 2023-09-29 DIAGNOSIS — N21.0 CALCULUS IN BLADDER: ICD-10-CM

## 2023-09-29 DIAGNOSIS — Z01.818 ENCOUNTER FOR OTHER PREPROCEDURAL EXAMINATION: ICD-10-CM

## 2023-09-29 LAB
ANION GAP SERPL CALC-SCNC: 11 MMOL/L — SIGNIFICANT CHANGE UP (ref 5–17)
BUN SERPL-MCNC: 17 MG/DL — SIGNIFICANT CHANGE UP (ref 7–23)
CALCIUM SERPL-MCNC: 9.4 MG/DL — SIGNIFICANT CHANGE UP (ref 8.4–10.5)
CHLORIDE SERPL-SCNC: 103 MMOL/L — SIGNIFICANT CHANGE UP (ref 96–108)
CO2 SERPL-SCNC: 23 MMOL/L — SIGNIFICANT CHANGE UP (ref 22–31)
CREAT SERPL-MCNC: 0.92 MG/DL — SIGNIFICANT CHANGE UP (ref 0.5–1.3)
EGFR: 95 ML/MIN/1.73M2 — SIGNIFICANT CHANGE UP
GLUCOSE SERPL-MCNC: 88 MG/DL — SIGNIFICANT CHANGE UP (ref 70–99)
HCT VFR BLD CALC: 47 % — SIGNIFICANT CHANGE UP (ref 39–50)
HGB BLD-MCNC: 15.5 G/DL — SIGNIFICANT CHANGE UP (ref 13–17)
MCHC RBC-ENTMCNC: 28.9 PG — SIGNIFICANT CHANGE UP (ref 27–34)
MCHC RBC-ENTMCNC: 33 GM/DL — SIGNIFICANT CHANGE UP (ref 32–36)
MCV RBC AUTO: 87.5 FL — SIGNIFICANT CHANGE UP (ref 80–100)
NRBC # BLD: 0 /100 WBCS — SIGNIFICANT CHANGE UP (ref 0–0)
PLATELET # BLD AUTO: 278 K/UL — SIGNIFICANT CHANGE UP (ref 150–400)
POTASSIUM SERPL-MCNC: 4.2 MMOL/L — SIGNIFICANT CHANGE UP (ref 3.5–5.3)
POTASSIUM SERPL-SCNC: 4.2 MMOL/L — SIGNIFICANT CHANGE UP (ref 3.5–5.3)
RBC # BLD: 5.37 M/UL — SIGNIFICANT CHANGE UP (ref 4.2–5.8)
RBC # FLD: 12.8 % — SIGNIFICANT CHANGE UP (ref 10.3–14.5)
SODIUM SERPL-SCNC: 137 MMOL/L — SIGNIFICANT CHANGE UP (ref 135–145)
WBC # BLD: 5.37 K/UL — SIGNIFICANT CHANGE UP (ref 3.8–10.5)
WBC # FLD AUTO: 5.37 K/UL — SIGNIFICANT CHANGE UP (ref 3.8–10.5)

## 2023-09-29 PROCEDURE — 85027 COMPLETE CBC AUTOMATED: CPT

## 2023-09-29 PROCEDURE — G0463: CPT

## 2023-09-29 PROCEDURE — 36415 COLL VENOUS BLD VENIPUNCTURE: CPT

## 2023-09-29 PROCEDURE — 87086 URINE CULTURE/COLONY COUNT: CPT

## 2023-09-29 PROCEDURE — 80048 BASIC METABOLIC PNL TOTAL CA: CPT

## 2023-09-29 NOTE — H&P PST ADULT - ASSESSMENT
DASI score: 8.6 METS  DASI activity: Exercises every day, uses elliptical.  Loose teeth or denture: No loose teeth or dentures  Mallampati: Class 2

## 2023-09-29 NOTE — H&P PST ADULT - NSICDXPASTMEDICALHX_GEN_ALL_CORE_FT
PAST MEDICAL HISTORY:  Bladder stone     BPH (benign prostatic hyperplasia)     Colon polyps     H/O carpal tunnel syndrome     Internal hemorrhoids     Nephrolithiasis

## 2023-09-29 NOTE — H&P PST ADULT - HISTORY OF PRESENT ILLNESS
64 y/o M with PMHx of Colon Polyps, BPH s/p Urolift procedure (10 years ago), reported symptoms of nocturia and weakened urinary stream, recent visit to [facility] ED on 09.23.2023 for symptoms of lower abdominal pain/spasm, CT demonstrated 2cm stone in urinary bladder. Patient is scheduled for Cystoscopy, Bladder Stone Removal with Dr. Hensley on 10/05/2023. 62 y/o M with PMHx of Colon Polyps, BPH s/p Urolift procedure (10 years ago), Anxiety, reported symptoms of nocturia and weakened urinary stream, recent visit to [facility] ED on 09.23.2023 for symptoms of lower abdominal pain/spasm, CT demonstrated 2cm stone in urinary bladder. Patient is scheduled for Cystoscopy, Bladder Stone Removal with Dr. Hensley on 10/05/2023.

## 2023-09-29 NOTE — H&P PST ADULT - PROBLEM SELECTOR PLAN 1
Scheduled for Cystoscopy, Bladder Stone Removal  Pre-op labs obtained. PST instructions provided. Patient verbalized understanding of instructions.

## 2023-09-29 NOTE — H&P PST ADULT - RESPIRATORY RATE (BREATHS/MIN)
----- Message from Jeff Wagner MD sent at 1/23/2020  8:39 AM EST -----  Tell patient negative Pap   16

## 2023-10-01 LAB
CULTURE RESULTS: NO GROWTH — SIGNIFICANT CHANGE UP
SPECIMEN SOURCE: SIGNIFICANT CHANGE UP

## 2023-10-04 ENCOUNTER — TRANSCRIPTION ENCOUNTER (OUTPATIENT)
Age: 63
End: 2023-10-04

## 2023-10-05 ENCOUNTER — APPOINTMENT (OUTPATIENT)
Dept: UROLOGY | Facility: HOSPITAL | Age: 63
End: 2023-10-05

## 2023-10-05 ENCOUNTER — RESULT REVIEW (OUTPATIENT)
Age: 63
End: 2023-10-05

## 2023-10-05 ENCOUNTER — TRANSCRIPTION ENCOUNTER (OUTPATIENT)
Age: 63
End: 2023-10-05

## 2023-10-05 ENCOUNTER — OUTPATIENT (OUTPATIENT)
Dept: INPATIENT UNIT | Facility: HOSPITAL | Age: 63
LOS: 1 days | End: 2023-10-05
Payer: MEDICAID

## 2023-10-05 VITALS
OXYGEN SATURATION: 97 % | HEIGHT: 69 IN | HEART RATE: 78 BPM | RESPIRATION RATE: 18 BRPM | TEMPERATURE: 98 F | SYSTOLIC BLOOD PRESSURE: 116 MMHG | DIASTOLIC BLOOD PRESSURE: 84 MMHG | WEIGHT: 186.07 LBS

## 2023-10-05 VITALS
OXYGEN SATURATION: 95 % | HEART RATE: 73 BPM | RESPIRATION RATE: 18 BRPM | SYSTOLIC BLOOD PRESSURE: 124 MMHG | TEMPERATURE: 97 F | DIASTOLIC BLOOD PRESSURE: 81 MMHG

## 2023-10-05 DIAGNOSIS — Z98.890 OTHER SPECIFIED POSTPROCEDURAL STATES: Chronic | ICD-10-CM

## 2023-10-05 DIAGNOSIS — N21.0 CALCULUS IN BLADDER: ICD-10-CM

## 2023-10-05 PROCEDURE — 88300 SURGICAL PATH GROSS: CPT

## 2023-10-05 PROCEDURE — 52318 REMOVE BLADDER STONE: CPT

## 2023-10-05 PROCEDURE — C1889: CPT

## 2023-10-05 PROCEDURE — 88300 SURGICAL PATH GROSS: CPT | Mod: 26

## 2023-10-05 PROCEDURE — C1758: CPT

## 2023-10-05 PROCEDURE — 82365 CALCULUS SPECTROSCOPY: CPT

## 2023-10-05 DEVICE — LASER FIBER FLEXIVA 550 ID: Type: IMPLANTABLE DEVICE | Site: BLADDER | Status: FUNCTIONAL

## 2023-10-05 DEVICE — URETERAL CATH SOF-FLEX OPEN END 6FR .040" X 70CM: Type: IMPLANTABLE DEVICE | Site: BLADDER | Status: FUNCTIONAL

## 2023-10-05 RX ORDER — ESCITALOPRAM OXALATE 10 MG/1
2 TABLET, FILM COATED ORAL
Refills: 0 | DISCHARGE

## 2023-10-05 RX ORDER — CIPROFLOXACIN LACTATE 400MG/40ML
1 VIAL (ML) INTRAVENOUS
Qty: 6 | Refills: 0
Start: 2023-10-05 | End: 2023-10-07

## 2023-10-05 RX ORDER — TAMSULOSIN HYDROCHLORIDE 0.4 MG/1
0.4 CAPSULE ORAL ONCE
Refills: 0 | Status: DISCONTINUED | OUTPATIENT
Start: 2023-10-05 | End: 2023-10-19

## 2023-10-05 RX ORDER — TAMSULOSIN HYDROCHLORIDE 0.4 MG/1
1 CAPSULE ORAL
Qty: 30 | Refills: 0
Start: 2023-10-05 | End: 2023-11-03

## 2023-10-05 RX ORDER — ONDANSETRON 8 MG/1
4 TABLET, FILM COATED ORAL ONCE
Refills: 0 | Status: DISCONTINUED | OUTPATIENT
Start: 2023-10-05 | End: 2023-10-05

## 2023-10-05 RX ORDER — LIDOCAINE HCL 20 MG/ML
0.2 VIAL (ML) INJECTION ONCE
Refills: 0 | Status: DISCONTINUED | OUTPATIENT
Start: 2023-10-05 | End: 2023-10-05

## 2023-10-05 RX ORDER — FENTANYL CITRATE 50 UG/ML
50 INJECTION INTRAVENOUS ONCE
Refills: 0 | Status: DISCONTINUED | OUTPATIENT
Start: 2023-10-05 | End: 2023-10-05

## 2023-10-05 RX ORDER — SODIUM CHLORIDE 9 MG/ML
3 INJECTION INTRAMUSCULAR; INTRAVENOUS; SUBCUTANEOUS EVERY 8 HOURS
Refills: 0 | Status: DISCONTINUED | OUTPATIENT
Start: 2023-10-05 | End: 2023-10-05

## 2023-10-05 RX ORDER — CEFAZOLIN SODIUM 1 G
2000 VIAL (EA) INJECTION ONCE
Refills: 0 | Status: COMPLETED | OUTPATIENT
Start: 2023-10-05 | End: 2023-10-05

## 2023-10-05 RX ORDER — FENTANYL CITRATE 50 UG/ML
25 INJECTION INTRAVENOUS
Refills: 0 | Status: DISCONTINUED | OUTPATIENT
Start: 2023-10-05 | End: 2023-10-05

## 2023-10-05 RX ORDER — SODIUM CHLORIDE 9 MG/ML
1000 INJECTION, SOLUTION INTRAVENOUS
Refills: 0 | Status: DISCONTINUED | OUTPATIENT
Start: 2023-10-05 | End: 2023-10-19

## 2023-10-05 NOTE — ASU PATIENT PROFILE, ADULT - FALL HARM RISK - UNIVERSAL INTERVENTIONS
Bed in lowest position, wheels locked, appropriate side rails in place/Call bell, personal items and telephone in reach/Instruct patient to call for assistance before getting out of bed or chair/Non-slip footwear when patient is out of bed/[place] to call system/Physically safe environment - no spills, clutter or unnecessary equipment/Purposeful Proactive Rounding/Room/bathroom lighting operational, light cord in reach

## 2023-10-05 NOTE — ASU DISCHARGE PLAN (ADULT/PEDIATRIC) - NS MD DC FALL RISK RISK
For information on Fall & Injury Prevention, visit: [email or web address] OR  [email or web address] OR  https://www.cdc.gov/steadi/patient.html

## 2023-10-05 NOTE — PRE-ANESTHESIA EVALUATION ADULT - NSANTHPEFT_GEN_ALL_CORE
General: well appearing, appears stated age, NAD  Cardiovascular: RRR  Respiratory: sating well on RA

## 2023-10-05 NOTE — ASU DISCHARGE PLAN (ADULT/PEDIATRIC) - ASU DC SPECIAL INSTRUCTIONSFT
GENERAL: It is common to have blood in your urine after your procedure. It may be pink or even red; inform your doctor if you have a significant amount of clot in the urine or if you are unable to void at all. The urine may clear and then become bloody again especially as you are more physically active.  BATHING: You may shower or bathe.  DIET: You may resume your regular diet and regular medication regimen.  PAIN: You may take Tylenol (acetaminophen) 650-975mg and/or Motrin (ibuprofen) 400-600mg, both available over the counter, for pain every 6 hours as needed. Do not exceed 4000mg of Tylenol (acetaminophen) daily. You may alternate these medications such that you take one or the other every 3 hours for around the clock pain coverage.   ANTIBIOTICS: You have been given a prescription for an antibiotic, please take this medication as instructed and be sure to complete the entire course.  STOOL SOFTENERS: Do not allow yourself to become constipated as straining may cause bleeding. Take stool softeners or a laxative (ex. Miralax, Colace, Senokot, ExLax, etc), available over the counter, if needed.  ACTIVITY: No heavy lifting or strenuous exercise until you are evaluated at your post-operative appointment. Otherwise, you may return to your usual level of physical activity.  FOLLOW-UP: If you did not already schedule your post-operative appointment, please call your urologist to schedule a follow-up appointment. Please follow-up with Dr. Hensley in 2 weeks.   CALL YOUR UROLOGIST IF: You have any bleeding that does not stop, inability to void >8 hours, fever over 100.4 F, chills, persistent nausea/vomiting, changes in your incision concerning for infection, or if your pain is not controlled on your discharge pain medications.

## 2023-10-06 PROBLEM — K63.5 POLYP OF COLON: Chronic | Status: ACTIVE | Noted: 2023-09-29

## 2023-10-06 PROBLEM — K64.8 OTHER HEMORRHOIDS: Chronic | Status: ACTIVE | Noted: 2023-09-29

## 2023-10-06 PROBLEM — N21.0 CALCULUS IN BLADDER: Chronic | Status: ACTIVE | Noted: 2023-09-29

## 2023-10-06 PROBLEM — N20.0 CALCULUS OF KIDNEY: Chronic | Status: ACTIVE | Noted: 2023-09-29

## 2023-10-06 PROBLEM — N40.0 BENIGN PROSTATIC HYPERPLASIA WITHOUT LOWER URINARY TRACT SYMPTOMS: Chronic | Status: ACTIVE | Noted: 2023-09-29

## 2023-10-06 PROBLEM — Z86.69 PERSONAL HISTORY OF OTHER DISEASES OF THE NERVOUS SYSTEM AND SENSE ORGANS: Chronic | Status: ACTIVE | Noted: 2023-09-29

## 2023-10-16 ENCOUNTER — APPOINTMENT (OUTPATIENT)
Dept: COLORECTAL SURGERY | Facility: CLINIC | Age: 63
End: 2023-10-16
Payer: MEDICAID

## 2023-10-16 LAB
CELL MATERIAL STONE EST-MCNT: SIGNIFICANT CHANGE UP
LABORATORY COMMENT REPORT: SIGNIFICANT CHANGE UP
NIDUS STONE QN: SIGNIFICANT CHANGE UP
SURGICAL PATHOLOGY STUDY: SIGNIFICANT CHANGE UP

## 2023-10-16 PROCEDURE — 45378 DIAGNOSTIC COLONOSCOPY: CPT

## 2023-10-18 ENCOUNTER — APPOINTMENT (OUTPATIENT)
Dept: UROLOGY | Facility: CLINIC | Age: 63
End: 2023-10-18
Payer: MEDICAID

## 2023-10-18 VITALS
WEIGHT: 182 LBS | SYSTOLIC BLOOD PRESSURE: 129 MMHG | HEIGHT: 69 IN | DIASTOLIC BLOOD PRESSURE: 82 MMHG | BODY MASS INDEX: 26.96 KG/M2 | RESPIRATION RATE: 17 BRPM | HEART RATE: 83 BPM

## 2023-10-18 PROCEDURE — 99212 OFFICE O/P EST SF 10 MIN: CPT | Mod: 25

## 2024-01-31 ENCOUNTER — APPOINTMENT (OUTPATIENT)
Dept: UROLOGY | Facility: CLINIC | Age: 64
End: 2024-01-31
Payer: MEDICAID

## 2024-01-31 VITALS
TEMPERATURE: 97.8 F | RESPIRATION RATE: 17 BRPM | HEART RATE: 83 BPM | HEIGHT: 69 IN | DIASTOLIC BLOOD PRESSURE: 89 MMHG | BODY MASS INDEX: 26.96 KG/M2 | WEIGHT: 182 LBS | SYSTOLIC BLOOD PRESSURE: 154 MMHG

## 2024-01-31 DIAGNOSIS — N40.1 BENIGN PROSTATIC HYPERPLASIA WITH LOWER URINARY TRACT SYMPMS: ICD-10-CM

## 2024-01-31 DIAGNOSIS — N21.0 CALCULUS IN BLADDER: ICD-10-CM

## 2024-01-31 DIAGNOSIS — R35.0 FREQUENCY OF MICTURITION: ICD-10-CM

## 2024-01-31 DIAGNOSIS — N13.8 BENIGN PROSTATIC HYPERPLASIA WITH LOWER URINARY TRACT SYMPMS: ICD-10-CM

## 2024-01-31 PROCEDURE — 99213 OFFICE O/P EST LOW 20 MIN: CPT

## 2024-01-31 NOTE — PHYSICAL EXAM
[General Appearance - Well Developed] : well developed [Skin Color & Pigmentation] : normal skin color and pigmentation [Edema] : no peripheral edema [] : no respiratory distress [Respiration, Rhythm And Depth] : normal respiratory rhythm and effort [Exaggerated Use Of Accessory Muscles For Inspiration] : no accessory muscle use [Oriented To Time, Place, And Person] : oriented to person, place, and time [Affect] : the affect was normal [Mood] : the mood was normal [Normal Station and Gait] : the gait and station were normal for the patient's age

## 2024-02-01 PROBLEM — N40.1 BPH WITH OBSTRUCTION/LOWER URINARY TRACT SYMPTOMS: Status: ACTIVE | Noted: 2023-09-25

## 2024-02-01 PROBLEM — N21.0 BLADDER CALCULUS: Status: ACTIVE | Noted: 2023-09-25

## 2024-02-01 PROBLEM — R35.0 URINARY FREQUENCY: Status: ACTIVE | Noted: 2023-10-18

## 2024-02-01 NOTE — ASSESSMENT
[FreeTextEntry1] : Baseline significant LUTS and high PVR remain despite Urolift Had tried tamsulosin in the past and did not like the side effects Alfuzosin prescribed in September 2023 but he has not taken it He is unbothered by his symptoms presently and does not wish to pursue treatment at this time  Plan:  12 months follow up PSA screening with PCP

## 2024-02-01 NOTE — HISTORY OF PRESENT ILLNESS
[Nocturia] : nocturia [Weak Stream] : weak stream [None] : None [FreeTextEntry1] : Referred by Dr. Rosas Maldonado  BPH hx of cystolitholapaxy 10/5/23 of stones formed around the Urolift clips Previous Urolift about 10 years ago, but it didn't make much of a difference Baseline nocturia and urinary frequency, weak stream, nocturia x 1-2, frequency and urgency which preceded his UroLift procedure remains unchanged after the procedure  We discussed and prescribed Alfuzosin during his last visit but he has not taken He is taking OTC prostate medication which is helping He had taken tamsulosin previously and didn't like the side effects  PVR today 100 mL    PSA screening Pt recalls that his PSAs have been normal, last one was in August 2023 and followed by his PCP  Surgical hx: Urolift 8-10 years ago- Dr. 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

## 2024-05-20 ENCOUNTER — NON-APPOINTMENT (OUTPATIENT)
Age: 64
End: 2024-05-20

## 2024-05-20 ENCOUNTER — APPOINTMENT (OUTPATIENT)
Dept: CARDIOLOGY | Facility: CLINIC | Age: 64
End: 2024-05-20
Payer: COMMERCIAL

## 2024-05-20 ENCOUNTER — TRANSCRIPTION ENCOUNTER (OUTPATIENT)
Age: 64
End: 2024-05-20

## 2024-05-20 VITALS
BODY MASS INDEX: 26.96 KG/M2 | SYSTOLIC BLOOD PRESSURE: 151 MMHG | HEART RATE: 72 BPM | WEIGHT: 182 LBS | DIASTOLIC BLOOD PRESSURE: 90 MMHG | OXYGEN SATURATION: 98 % | HEIGHT: 69 IN | RESPIRATION RATE: 16 BRPM

## 2024-05-20 DIAGNOSIS — E78.5 HYPERLIPIDEMIA, UNSPECIFIED: ICD-10-CM

## 2024-05-20 DIAGNOSIS — R03.0 ELEVATED BLOOD-PRESSURE READING, W/OUT DIAGNOSIS OF HYPERTENSION: ICD-10-CM

## 2024-05-20 DIAGNOSIS — R00.2 PALPITATIONS: ICD-10-CM

## 2024-05-20 PROCEDURE — G2211 COMPLEX E/M VISIT ADD ON: CPT | Mod: NC,1L

## 2024-05-20 PROCEDURE — 99204 OFFICE O/P NEW MOD 45 MIN: CPT | Mod: 25

## 2024-05-20 PROCEDURE — 93000 ELECTROCARDIOGRAM COMPLETE: CPT

## 2024-05-20 RX ORDER — ROSUVASTATIN CALCIUM 5 MG/1
5 TABLET, FILM COATED ORAL
Refills: 0 | Status: ACTIVE | COMMUNITY

## 2024-05-20 NOTE — DISCUSSION/SUMMARY
[FreeTextEntry1] : 64 year old man with intermittent palpitations.  Event recorder ordered.  HLD only recently started treatment.  Coronary calcium study ordered. [EKG obtained to assist in diagnosis and management of assessed problem(s)] : EKG obtained to assist in diagnosis and management of assessed problem(s)

## 2024-05-20 NOTE — HISTORY OF PRESENT ILLNESS
[FreeTextEntry1] : 64 year old male with HLD but otherwise good health.  He has had several episodes of palpitations, described as irregular beats lasting 30 minutes to 2 hours.  No associated chest discomfort, SOB or lightheadedness.  he exercises regularly and has had no symptoms with exercise.  His LDL is high and he was recently started on rosuvastatin 5 mg.

## 2024-05-25 RX ORDER — METOPROLOL SUCCINATE 25 MG/1
25 TABLET, EXTENDED RELEASE ORAL DAILY
Qty: 90 | Refills: 3 | Status: ACTIVE | COMMUNITY
Start: 2024-05-25 | End: 1900-01-01

## 2024-05-29 ENCOUNTER — NON-APPOINTMENT (OUTPATIENT)
Age: 64
End: 2024-05-29

## 2024-06-04 ENCOUNTER — APPOINTMENT (OUTPATIENT)
Dept: CT IMAGING | Facility: CLINIC | Age: 64
End: 2024-06-04
Payer: COMMERCIAL

## 2024-06-04 ENCOUNTER — OUTPATIENT (OUTPATIENT)
Dept: OUTPATIENT SERVICES | Facility: HOSPITAL | Age: 64
LOS: 1 days | End: 2024-06-04
Payer: COMMERCIAL

## 2024-06-04 DIAGNOSIS — E78.5 HYPERLIPIDEMIA, UNSPECIFIED: ICD-10-CM

## 2024-06-04 DIAGNOSIS — Z98.890 OTHER SPECIFIED POSTPROCEDURAL STATES: Chronic | ICD-10-CM

## 2024-06-04 PROCEDURE — 75571 CT HRT W/O DYE W/CA TEST: CPT

## 2024-06-04 PROCEDURE — 75571 CT HRT W/O DYE W/CA TEST: CPT | Mod: 26,59

## 2024-06-13 ENCOUNTER — NON-APPOINTMENT (OUTPATIENT)
Age: 64
End: 2024-06-13

## 2024-06-18 ENCOUNTER — NON-APPOINTMENT (OUTPATIENT)
Age: 64
End: 2024-06-18

## 2024-06-18 ENCOUNTER — APPOINTMENT (OUTPATIENT)
Dept: ELECTROPHYSIOLOGY | Facility: CLINIC | Age: 64
End: 2024-06-18
Payer: COMMERCIAL

## 2024-06-18 VITALS
BODY MASS INDEX: 27.4 KG/M2 | RESPIRATION RATE: 14 BRPM | WEIGHT: 185 LBS | DIASTOLIC BLOOD PRESSURE: 81 MMHG | OXYGEN SATURATION: 97 % | HEIGHT: 69 IN | SYSTOLIC BLOOD PRESSURE: 129 MMHG | HEART RATE: 64 BPM

## 2024-06-18 DIAGNOSIS — I48.0 PAROXYSMAL ATRIAL FIBRILLATION: ICD-10-CM

## 2024-06-18 PROCEDURE — 93000 ELECTROCARDIOGRAM COMPLETE: CPT

## 2024-06-18 PROCEDURE — 99205 OFFICE O/P NEW HI 60 MIN: CPT | Mod: 25

## 2024-06-18 RX ORDER — ALFUZOSIN HYDROCHLORIDE 10 MG/1
10 TABLET, EXTENDED RELEASE ORAL DAILY
Qty: 30 | Refills: 1 | Status: DISCONTINUED | COMMUNITY
Start: 2023-09-25 | End: 2024-06-18

## 2024-06-19 PROBLEM — I48.0 AF (PAROXYSMAL ATRIAL FIBRILLATION): Status: ACTIVE | Noted: 2024-05-25

## 2024-06-19 NOTE — CARDIOLOGY SUMMARY
[de-identified] : today: Sinus Rhythm  -RSR(V1) -nondiagnostic. [de-identified] : 5/22/204 AFib and flutter [de-identified] : 5/20/2024 The calculated Agatston score is 61.

## 2024-06-19 NOTE — DISCUSSION/SUMMARY
[FreeTextEntry1] : PAF/Afl He hill check TSH (he will call PMD) negative FAWN evaluation  We discussed that he has had some elevated blood pressures and if he does have HTN it would be an increased risk for stroke in the setting of PAF.  He explains that one of those readings was in the setting of a kidney stone and BPs at home are typically good.  Await echo to assess for substrate including hypertensive heart disease.  Ultiimately favor ablation... will discuss after echo

## 2024-06-19 NOTE — HISTORY OF PRESENT ILLNESS
[FreeTextEntry1] : 64 year old man with a history of hyperlipidemia presents for an evaluation of palpitations.He has been on lexapro for anxiety which he has been tapering off.  He has had palpitations for many years, but it has really been the past month.  It had been worse with caffeine and chocolate (30 - 40 years ago). It had also been 2 -3 minutes and would respond to vagal maneuvers.  For the past month he was not able to break the rhythm.  His wife checked his pulse which was "irregular".  He had another episode that was also was very symptomatic.  These episodes lasted about 2 hours.  His wife said that his appearance was also not "good".  One episode was with a HR of 100 and definitely "irregular". The day following these events he was very fatigued.  No LH/idzziness.  he does feel fatigued.  No chest pain. NO shortness of breath.  He was given a monitor which confirmed the diagnosis of atrial fibrillation. Episods are random and he is not aware of a trigger.  2 episodes have occured with gardening and 2 at rest.  He rides his bike everyday and he has not had an episode while cycling. On finding the AF he was advised to start a beta blocker which she has not started yet. He is very apprehensive about taking oral AC because a family member  from an ICH.

## 2024-06-25 ENCOUNTER — APPOINTMENT (OUTPATIENT)
Dept: CV DIAGNOSITCS | Facility: HOSPITAL | Age: 64
End: 2024-06-25

## 2024-06-25 ENCOUNTER — RESULT REVIEW (OUTPATIENT)
Age: 64
End: 2024-06-25

## 2024-06-25 ENCOUNTER — OUTPATIENT (OUTPATIENT)
Dept: OUTPATIENT SERVICES | Facility: HOSPITAL | Age: 64
LOS: 1 days | End: 2024-06-25
Payer: COMMERCIAL

## 2024-06-25 DIAGNOSIS — Z98.890 OTHER SPECIFIED POSTPROCEDURAL STATES: Chronic | ICD-10-CM

## 2024-06-25 DIAGNOSIS — I48.0 PAROXYSMAL ATRIAL FIBRILLATION: ICD-10-CM

## 2024-06-25 PROCEDURE — 76376 3D RENDER W/INTRP POSTPROCES: CPT | Mod: 26

## 2024-06-25 PROCEDURE — 93306 TTE W/DOPPLER COMPLETE: CPT | Mod: 26

## 2024-06-25 PROCEDURE — 93306 TTE W/DOPPLER COMPLETE: CPT

## 2024-06-25 PROCEDURE — 76376 3D RENDER W/INTRP POSTPROCES: CPT

## 2024-07-19 ENCOUNTER — OUTPATIENT (OUTPATIENT)
Dept: OUTPATIENT SERVICES | Facility: HOSPITAL | Age: 64
LOS: 1 days | End: 2024-07-19
Payer: COMMERCIAL

## 2024-07-19 VITALS
OXYGEN SATURATION: 96 % | HEIGHT: 69 IN | HEART RATE: 83 BPM | DIASTOLIC BLOOD PRESSURE: 92 MMHG | WEIGHT: 186.95 LBS | RESPIRATION RATE: 16 BRPM | SYSTOLIC BLOOD PRESSURE: 145 MMHG | TEMPERATURE: 99 F

## 2024-07-19 DIAGNOSIS — Z98.890 OTHER SPECIFIED POSTPROCEDURAL STATES: Chronic | ICD-10-CM

## 2024-07-19 DIAGNOSIS — I48.0 PAROXYSMAL ATRIAL FIBRILLATION: ICD-10-CM

## 2024-07-19 DIAGNOSIS — Z01.818 ENCOUNTER FOR OTHER PREPROCEDURAL EXAMINATION: ICD-10-CM

## 2024-07-19 DIAGNOSIS — N21.0 CALCULUS IN BLADDER: Chronic | ICD-10-CM

## 2024-07-19 LAB
ANION GAP SERPL CALC-SCNC: 12 MMOL/L — SIGNIFICANT CHANGE UP (ref 5–17)
BLD GP AB SCN SERPL QL: NEGATIVE — SIGNIFICANT CHANGE UP
BUN SERPL-MCNC: 22 MG/DL — SIGNIFICANT CHANGE UP (ref 7–23)
CALCIUM SERPL-MCNC: 9.2 MG/DL — SIGNIFICANT CHANGE UP (ref 8.4–10.5)
CHLORIDE SERPL-SCNC: 103 MMOL/L — SIGNIFICANT CHANGE UP (ref 96–108)
CO2 SERPL-SCNC: 22 MMOL/L — SIGNIFICANT CHANGE UP (ref 22–31)
CREAT SERPL-MCNC: 1 MG/DL — SIGNIFICANT CHANGE UP (ref 0.5–1.3)
EGFR: 85 ML/MIN/1.73M2 — SIGNIFICANT CHANGE UP
GLUCOSE SERPL-MCNC: 108 MG/DL — HIGH (ref 70–99)
HCT VFR BLD CALC: 46.5 % — SIGNIFICANT CHANGE UP (ref 39–50)
HGB BLD-MCNC: 15.4 G/DL — SIGNIFICANT CHANGE UP (ref 13–17)
MCHC RBC-ENTMCNC: 28.4 PG — SIGNIFICANT CHANGE UP (ref 27–34)
MCHC RBC-ENTMCNC: 33.1 GM/DL — SIGNIFICANT CHANGE UP (ref 32–36)
MCV RBC AUTO: 85.6 FL — SIGNIFICANT CHANGE UP (ref 80–100)
NRBC # BLD: 0 /100 WBCS — SIGNIFICANT CHANGE UP (ref 0–0)
PLATELET # BLD AUTO: 223 K/UL — SIGNIFICANT CHANGE UP (ref 150–400)
POTASSIUM SERPL-MCNC: 3.6 MMOL/L — SIGNIFICANT CHANGE UP (ref 3.5–5.3)
POTASSIUM SERPL-SCNC: 3.6 MMOL/L — SIGNIFICANT CHANGE UP (ref 3.5–5.3)
RBC # BLD: 5.43 M/UL — SIGNIFICANT CHANGE UP (ref 4.2–5.8)
RBC # FLD: 13 % — SIGNIFICANT CHANGE UP (ref 10.3–14.5)
RH IG SCN BLD-IMP: POSITIVE — SIGNIFICANT CHANGE UP
SODIUM SERPL-SCNC: 137 MMOL/L — SIGNIFICANT CHANGE UP (ref 135–145)
WBC # BLD: 7.15 K/UL — SIGNIFICANT CHANGE UP (ref 3.8–10.5)
WBC # FLD AUTO: 7.15 K/UL — SIGNIFICANT CHANGE UP (ref 3.8–10.5)

## 2024-07-19 PROCEDURE — 86900 BLOOD TYPING SEROLOGIC ABO: CPT

## 2024-07-19 PROCEDURE — 36415 COLL VENOUS BLD VENIPUNCTURE: CPT

## 2024-07-19 PROCEDURE — 85027 COMPLETE CBC AUTOMATED: CPT

## 2024-07-19 PROCEDURE — 80048 BASIC METABOLIC PNL TOTAL CA: CPT

## 2024-07-19 PROCEDURE — G0463: CPT

## 2024-07-19 PROCEDURE — 86901 BLOOD TYPING SEROLOGIC RH(D): CPT

## 2024-07-19 PROCEDURE — 86850 RBC ANTIBODY SCREEN: CPT

## 2024-07-19 NOTE — H&P PST ADULT - ASSESSMENT
DASI score: 8.6 METS  DASI activity: Exercises every day, uses elliptical.  Loose teeth or denture: No loose teeth or dentures

## 2024-07-19 NOTE — H&P PST ADULT - NSICDXPASTSURGICALHX_GEN_ALL_CORE_FT
PAST SURGICAL HISTORY:  History of prostate surgery     S/P colonoscopy     Urinary bladder stone

## 2024-07-19 NOTE — H&P PST ADULT - ADMIT DATE
Ongoing SW/CM Assessment/Plan of Care Note     See SW/CM flowsheets for goals and other objective data.    Patient/Family discharge goal (s):             PT Recommendation:       OT Recommendation:       SLP Recommendation:       Disposition:       Progress note:   ATTEMPTED DC ASSESSMENT VIA PHONE. NO ANSWER, MESSAGE LEFT. PT DOES NOT HAVE EMERGENCY CONTACTS LISTED.TW         19-Jul-2024

## 2024-07-19 NOTE — H&P PST ADULT - COMMENTS
many years of palpitations, rare intermittently, last 2 months occurring more frequently. Saw a cardiologist, Dx: Tip.

## 2024-07-19 NOTE — H&P PST ADULT - HISTORY OF PRESENT ILLNESS
63 y/o M with PMHx of Afib, colon Polyps, BPH s/p Urolift procedure (10 years ago), Anxiety, s/p Cystoscopy, Bladder Stone Removal with Dr. Hensley on 10/05/2023. Pt reports many years of palpitations, rare and intermittently, last 2 months occurring more frequently., saw a cardiologist, Dx: Afib. Pt denies c/p or SOB. Pt evaluated by Dr. Dos Santos for a scheduled Afib ablation on 8/2/2024.

## 2024-07-19 NOTE — H&P PST ADULT - NSHP PST SURGERY DATE_DT_GEN_A_CORE
Patient states he fell in shower around 2100 and hit his upper back. Patient now reports pain to area. Patient also states he is unable to move his lower extremities. Pt denies hitting head or LOC. 02-Aug-2024

## 2024-07-29 ENCOUNTER — NON-APPOINTMENT (OUTPATIENT)
Age: 64
End: 2024-07-29

## 2024-08-02 ENCOUNTER — TRANSCRIPTION ENCOUNTER (OUTPATIENT)
Age: 64
End: 2024-08-02

## 2024-08-02 ENCOUNTER — INPATIENT (INPATIENT)
Facility: HOSPITAL | Age: 64
LOS: 2 days | Discharge: HOME | DRG: 310 | End: 2024-08-05
Attending: STUDENT IN AN ORGANIZED HEALTH CARE EDUCATION/TRAINING PROGRAM | Admitting: INTERNAL MEDICINE
Payer: COMMERCIAL

## 2024-08-02 ENCOUNTER — RESULT REVIEW (OUTPATIENT)
Age: 64
End: 2024-08-02

## 2024-08-02 VITALS
SYSTOLIC BLOOD PRESSURE: 141 MMHG | DIASTOLIC BLOOD PRESSURE: 95 MMHG | TEMPERATURE: 98 F | RESPIRATION RATE: 16 BRPM | WEIGHT: 184.97 LBS | HEIGHT: 69 IN | HEART RATE: 57 BPM | OXYGEN SATURATION: 97 %

## 2024-08-02 DIAGNOSIS — N21.0 CALCULUS IN BLADDER: Chronic | ICD-10-CM

## 2024-08-02 DIAGNOSIS — I48.0 PAROXYSMAL ATRIAL FIBRILLATION: ICD-10-CM

## 2024-08-02 DIAGNOSIS — Z98.890 OTHER SPECIFIED POSTPROCEDURAL STATES: Chronic | ICD-10-CM

## 2024-08-02 LAB
ANION GAP SERPL CALC-SCNC: 16 MMOL/L — SIGNIFICANT CHANGE UP (ref 5–17)
BUN SERPL-MCNC: 17 MG/DL — SIGNIFICANT CHANGE UP (ref 7–23)
CALCIUM SERPL-MCNC: 8.1 MG/DL — LOW (ref 8.4–10.5)
CHLORIDE SERPL-SCNC: 107 MMOL/L — SIGNIFICANT CHANGE UP (ref 96–108)
CO2 SERPL-SCNC: 16 MMOL/L — LOW (ref 22–31)
CREAT SERPL-MCNC: 0.96 MG/DL — SIGNIFICANT CHANGE UP (ref 0.5–1.3)
EGFR: 89 ML/MIN/1.73M2 — SIGNIFICANT CHANGE UP
GLUCOSE BLDC GLUCOMTR-MCNC: 134 MG/DL — HIGH (ref 70–99)
GLUCOSE SERPL-MCNC: 159 MG/DL — HIGH (ref 70–99)
HCT VFR BLD CALC: 43.9 % — SIGNIFICANT CHANGE UP (ref 39–50)
HGB BLD-MCNC: 15 G/DL — SIGNIFICANT CHANGE UP (ref 13–17)
LACTATE SERPL-SCNC: 2.5 MMOL/L — HIGH (ref 0.5–2)
LACTATE SERPL-SCNC: 3.4 MMOL/L — HIGH (ref 0.5–2)
MCHC RBC-ENTMCNC: 29.4 PG — SIGNIFICANT CHANGE UP (ref 27–34)
MCHC RBC-ENTMCNC: 34.2 GM/DL — SIGNIFICANT CHANGE UP (ref 32–36)
MCV RBC AUTO: 86.1 FL — SIGNIFICANT CHANGE UP (ref 80–100)
NRBC # BLD: 0 /100 WBCS — SIGNIFICANT CHANGE UP (ref 0–0)
PLATELET # BLD AUTO: 208 K/UL — SIGNIFICANT CHANGE UP (ref 150–400)
POTASSIUM SERPL-MCNC: 4.1 MMOL/L — SIGNIFICANT CHANGE UP (ref 3.5–5.3)
POTASSIUM SERPL-SCNC: 4.1 MMOL/L — SIGNIFICANT CHANGE UP (ref 3.5–5.3)
RBC # BLD: 5.1 M/UL — SIGNIFICANT CHANGE UP (ref 4.2–5.8)
RBC # FLD: 12.9 % — SIGNIFICANT CHANGE UP (ref 10.3–14.5)
SODIUM SERPL-SCNC: 139 MMOL/L — SIGNIFICANT CHANGE UP (ref 135–145)
WBC # BLD: 9.35 K/UL — SIGNIFICANT CHANGE UP (ref 3.8–10.5)
WBC # FLD AUTO: 9.35 K/UL — SIGNIFICANT CHANGE UP (ref 3.8–10.5)

## 2024-08-02 PROCEDURE — 93623 PRGRMD STIMJ&PACG IV RX NFS: CPT | Mod: 26

## 2024-08-02 PROCEDURE — 93308 TTE F-UP OR LMTD: CPT | Mod: 26

## 2024-08-02 PROCEDURE — 71045 X-RAY EXAM CHEST 1 VIEW: CPT | Mod: 26

## 2024-08-02 PROCEDURE — 93010 ELECTROCARDIOGRAM REPORT: CPT | Mod: 76

## 2024-08-02 PROCEDURE — 93657 TX L/R ATRIAL FIB ADDL: CPT

## 2024-08-02 PROCEDURE — 93656 COMPRE EP EVAL ABLTJ ATR FIB: CPT

## 2024-08-02 RX ORDER — ESCITALOPRAM OXALATE 20 MG
5 TABLET ORAL DAILY
Refills: 0 | Status: DISCONTINUED | OUTPATIENT
Start: 2024-08-02 | End: 2024-08-05

## 2024-08-02 RX ORDER — ATORVASTATIN CALCIUM 40 MG/1
20 TABLET, FILM COATED ORAL AT BEDTIME
Refills: 0 | Status: DISCONTINUED | OUTPATIENT
Start: 2024-08-02 | End: 2024-08-05

## 2024-08-02 RX ORDER — ROSUVASTATIN CALCIUM 20 MG/1
1 TABLET ORAL
Refills: 0 | DISCHARGE

## 2024-08-02 RX ORDER — ACETAMINOPHEN 500 MG
650 TABLET ORAL ONCE
Refills: 0 | Status: DISCONTINUED | OUTPATIENT
Start: 2024-08-02 | End: 2024-08-02

## 2024-08-02 RX ORDER — APIXABAN 5 MG/1
1 TABLET, FILM COATED ORAL
Qty: 180 | Refills: 0
Start: 2024-08-02 | End: 2024-10-30

## 2024-08-02 RX ORDER — ONDANSETRON HCL/PF 4 MG/2 ML
8 VIAL (ML) INJECTION ONCE
Refills: 0 | Status: COMPLETED | OUTPATIENT
Start: 2024-08-02 | End: 2024-08-02

## 2024-08-02 RX ORDER — BACTERIOSTATIC SODIUM CHLORIDE 0.9 %
250 VIAL (ML) INJECTION ONCE
Refills: 0 | Status: COMPLETED | OUTPATIENT
Start: 2024-08-02 | End: 2024-08-02

## 2024-08-02 RX ORDER — ZALEPLON 10 MG/1
1 CAPSULE ORAL
Refills: 0 | DISCHARGE

## 2024-08-02 RX ORDER — ACETAMINOPHEN 500 MG
1000 TABLET ORAL ONCE
Refills: 0 | Status: COMPLETED | OUTPATIENT
Start: 2024-08-02 | End: 2024-08-02

## 2024-08-02 RX ORDER — APIXABAN 5 MG/1
5 TABLET, FILM COATED ORAL ONCE
Refills: 0 | Status: COMPLETED | OUTPATIENT
Start: 2024-08-02 | End: 2024-08-02

## 2024-08-02 RX ORDER — APIXABAN 5 MG/1
5 TABLET, FILM COATED ORAL EVERY 12 HOURS
Refills: 0 | Status: DISCONTINUED | OUTPATIENT
Start: 2024-08-03 | End: 2024-08-05

## 2024-08-02 RX ORDER — DOPAMINE HCL 200 MG/5ML
2 VIAL (ML) INTRAVENOUS
Qty: 400 | Refills: 0 | Status: DISCONTINUED | OUTPATIENT
Start: 2024-08-02 | End: 2024-08-02

## 2024-08-02 RX ORDER — METOPROLOL TARTRATE 50 MG
1 TABLET ORAL
Refills: 0 | DISCHARGE

## 2024-08-02 RX ORDER — APIXABAN 5 MG/1
5 TABLET, FILM COATED ORAL
Refills: 0 | Status: DISCONTINUED | OUTPATIENT
Start: 2024-08-02 | End: 2024-08-02

## 2024-08-02 RX ADMIN — Medication 8 MILLIGRAM(S): at 14:17

## 2024-08-02 RX ADMIN — Medication 250 MILLILITER(S): at 16:20

## 2024-08-02 RX ADMIN — Medication 400 MILLIGRAM(S): at 13:43

## 2024-08-02 RX ADMIN — ATORVASTATIN CALCIUM 20 MILLIGRAM(S): 40 TABLET, FILM COATED ORAL at 21:22

## 2024-08-02 RX ADMIN — Medication 1000 MILLIGRAM(S): at 14:28

## 2024-08-02 RX ADMIN — APIXABAN 5 MILLIGRAM(S): 5 TABLET, FILM COATED ORAL at 17:32

## 2024-08-02 RX ADMIN — Medication 1000 MILLILITER(S): at 23:45

## 2024-08-02 NOTE — ASU DISCHARGE PLAN (ADULT/PEDIATRIC) - PROVIDER TOKENS
PROVIDER:[TOKEN:[[id number]],SCHEDULEDAPPT:[09/24/2024],SCHEDULEDAPPTTIME:[08:30 AM],ESTABLISHEDPATIENT:[T]]

## 2024-08-02 NOTE — ASU DISCHARGE PLAN (ADULT/PEDIATRIC) - ASU DC SPECIAL INSTRUCTIONSFT
WOUND CARE:  The day AFTER your procedure  - Remove the bandage at the site GENTLY, clean with mild soap and water, and pat dry; leave open to air  - You may shower   - DO NOT apply lotions, creams, ointments, powder, parfumes to your incision site  -Check your groin every day. A small amount of bruising or soarness is normal, a bump ( smaller than nickel) might be present, normal  - DO NOT SOAK your site for 1 week ( no baths, no pools, no tubs, etc..)  ACTIVITY:  YOur procedure was done through your groin  for the next 5 DAYS:  - Limit climbing stairs, no strenous activity, pushing , pulling, or straining   DO NOT LIFT anything 10 lbs or heavier   you may resume sexual activity in 7 days, unless instructed otherwise  mild palpitations are normal   Follow heart healthy diet reccomended by your doctor, , if you smoke STOP SMOKING ( may call [phone] for center of tobacco control if you need assistance).  for the next 24 hours:   - stay at home and rest, do not drive or operate heavy E[employer]iary   do not drink alcoholic beverages   do not make important personal or business decisions     ***CALL YOUR DOCTOR ***  IF you have fever, chils, body aches, or severe pain, swelling, redness, heat, yellow drainage from your incision site  IF bleeding  or significant new swelling from your puncture site  IF you experience rapid heartbeat or palpitations that cause: lightheadness, dizziness, or fainting spell.  If you eperience difficulty swallowing, or pain with swallowing   IF unable to ge tin contact with yout doctor, you may call the Cardiology Office at [facility] at [phone]

## 2024-08-02 NOTE — ASU DISCHARGE PLAN (ADULT/PEDIATRIC) - CARE PROVIDER_API CALL
Bhupendra Dos Santos.  Cardiac Electrophysiology  [address] [place] [id number]  Phone: [phone]  Fax: [phone]  Established Patient  Scheduled Appointment: 09/24/2024 08:30 AM

## 2024-08-02 NOTE — CONSULT NOTE ADULT - ASSESSMENT
65 y/o M with PMHx of Afib, colon Polyps, BPH s/p Urolift procedure (10 years ago), Anxiety, s/p Cystoscopy, Bladder Stone Removal with Dr. Hensley on 10/05/2023 here for afib ablation with Dr. Dos Santos. CCB hypotension and bradycardia after procedure for which CICU was consulted. Pt briefly on dopamine gtt but now stable and asymptomatic off gtt. Likely 2/2 vasovagal episode post-procedure. Pressures may also be a bit lower than baseline post-anesthesia.     Recommendations:   -Continue to monitor vital signs, consider o/n admission to tele unit.   -Would pull A line and use cuff pressures  -Monitor off dopamine  -Pt not a candidate for the CICU at this time      Case discussed with CICU attending Dr. Beau Reid 65 y/o M with PMHx of Afib, colon Polyps, BPH s/p Urolift procedure (10 years ago), Anxiety, s/p Cystoscopy, Bladder Stone Removal with Dr. Hensley on 10/05/2023 here for afib ablation with Dr. Dos Santos. CCB hypotension and bradycardia after procedure for which CICU was consulted. Pt briefly on dopamine gtt but now stable and asymptomatic off gtt. Likely 2/2 vasovagal episode post-procedure. Pressures may also be a bit lower than baseline post-anesthesia. TTE without pericardial effusion.    Recommendations:   -Continue to monitor vital signs, consider o/n admission to tele unit.   -Would pull A line and use cuff pressures  -Monitor off dopamine  -Pt not a candidate for the CICU at this time      Case discussed with CICU attending Dr. Sadiq Reid

## 2024-08-02 NOTE — CONSULT NOTE ADULT - SUBJECTIVE AND OBJECTIVE BOX
HPI:  63 y/o M with PMHx of Afib, colon Polyps, BPH s/p Urolift procedure (10 years ago), Anxiety, s/p Cystoscopy, Bladder Stone Removal with Dr. Hensley on 10/05/2023. Pt reports many years of palpitations, rare and intermittently, last 2 months occurring more frequently., saw a cardiologist, Dx: Afib. Pt denies c/p or SOB. Pt evaluated by Dr. Dos Santos for a scheduled Afib ablation on 8/2/2024.     (19 Jul 2024 15:35)    At approximately 2:30PM pt felt cool with nausea and was found to be bradycardic to 40s with hypotension SBP 60s. SBP improved to 100/50s s/p IVF bolus and atropine. On my exam pt currently feeling much better. Denies dizziness, light-headedness, chest pain, SOB, difficulty swallowing, n/v/d, abdominal pain, or blurry vision.     Per EP team no complications during procedure. POCUS without effusion.      (19 Jul 2024 15:35)      PAST MEDICAL & SURGICAL HISTORY:  BPH (benign prostatic hyperplasia)      Nephrolithiasis      Bladder stone      Internal hemorrhoids      Colon polyps      H/O carpal tunnel syndrome      History of prostate surgery      Urinary bladder stone      S/P colonoscopy                ECHO  FINDINGS:      MEDICATIONS  (STANDING):  atorvastatin 20 milliGRAM(s) Oral at bedtime  escitalopram 5 milliGRAM(s) Oral daily    MEDICATIONS  (PRN):      FAMILY HISTORY:          REVIEW OF SYSTEMS:    CONSTITUTIONAL: No weakness, fevers or chills  EYES No eye pain, No visual changes;  ENT:  No vertigo or throat pain, no nasal congestion  NECK: No pain or stiffness  RESPIRATORY: No cough, wheezing, hemoptysis; No shortness of breath  CARDIOVASCULAR: No chest pain or palpitations  GASTROINTESTINAL: No abdominal or epigastric pain. No nausea, vomiting, or hematemesis; No diarrhea or constipation. No melena or hematochezia.  GENITOURINARY: No dysuria, frequency or hematuria  NEUROLOGICAL: No numbness or weakness  Psych: No anxiety, no depression  SKIN: No itching, rashes. RLE femoral site without tenderness, ecchymosis, or evidence of hematoma      Allergic/Immunologic:	  SOCIAL HISTORY:    CIGARETTES:    ALCOHOL:  Vital Signs Last 24 Hrs  T(C): 36.8 (02 Aug 2024 16:02), Max: 36.8 (02 Aug 2024 16:02)  T(F): 98.3 (02 Aug 2024 16:02), Max: 98.3 (02 Aug 2024 16:02)  HR: 74 (02 Aug 2024 17:30) (43 - 91)  BP: 100/60 (02 Aug 2024 17:30) (65/35 - 160/71)  BP(mean): 73 (02 Aug 2024 17:30) (45 - 102)  RR: 18 (02 Aug 2024 17:30) (16 - 20)  SpO2: 97% (02 Aug 2024 17:30) (90% - 98%)    Parameters below as of 02 Aug 2024 17:30  Patient On (Oxygen Delivery Method): nasal cannula  O2 Flow (L/min): 2      PHYSICAL EXAM:  GENERAL: NAD, Resting in bed  HEENT:  Head atraumatic, EOMI, PERRLA, conjunctiva and sclera clear; Moist mucous membranes, normal oropharynx  NECK: Supple, No JVD, no lymphadenopathy, no thyroid nodules or enlargement  CHEST/LUNG: Clear to auscultation bilaterally; No rales, rhonchi, wheezing, or rubs. Unlabored respirations on room air  HEART: Regular rate and rhythm; No murmurs, rubs, or gallops  ABDOMEN: Bowel sounds present; Soft, Nontender, Nondistended. No hepatomegally  EXTREMITIES:  2+ Peripheral Pulses, brisk capillary refill. No clubbing, cyanosis, or edema  NERVOUS SYSTEM:  Alert & Oriented X3, non-focal and spontaneous movements of all extremities  SKIN: No rashes or lesions    ECG:    I&O's Detail    02 Aug 2024 07:01  -  02 Aug 2024 17:38  --------------------------------------------------------  IN:  Total IN: 0 mL    OUT:    Voided (mL): 800 mL  Total OUT: 800 mL    Total NET: -800 mL          LABS:                        15.0   9.35  )-----------( 208      ( 02 Aug 2024 15:32 )             43.9     08-02    139  |  107  |  17  ----------------------------<  159<H>  4.1   |  16<L>  |  0.96    Ca    8.1<L>      02 Aug 2024 15:32            Urinalysis Basic - ( 02 Aug 2024 15:32 )    Color: x / Appearance: x / SG: x / pH: x  Gluc: 159 mg/dL / Ketone: x  / Bili: x / Urobili: x   Blood: x / Protein: x / Nitrite: x   Leuk Esterase: x / RBC: x / WBC x   Sq Epi: x / Non Sq Epi: x / Bacteria: x      I&O's Summary    02 Aug 2024 07:01  -  02 Aug 2024 17:38  --------------------------------------------------------  IN: 0 mL / OUT: 800 mL / NET: -800 mL      BNP  RADIOLOGY & ADDITIONAL STUDIES: HPI:  65 y/o M with PMHx of Afib, colon Polyps, BPH s/p Urolift procedure (10 years ago), Anxiety, s/p Cystoscopy, Bladder Stone Removal with Dr. Hensley on 10/05/2023. Pt reports many years of palpitations, rare and intermittently, last 2 months occurring more frequently., saw a cardiologist, Dx: Afib. Pt denies c/p or SOB. Pt evaluated by Dr. Dos Santos for a scheduled Afib ablation on 8/2/2024.     (19 Jul 2024 15:35)    At approximately 2:30PM pt felt cool with nausea and was found to be bradycardic to 40s with hypotension SBP 60s. SBP improved to 100/50s s/p IVF bolus and atropine. On my exam pt currently feeling much better. Denies dizziness, light-headedness, chest pain, SOB, difficulty swallowing, n/v/d, abdominal pain, or blurry vision.     Per EP team no complications during procedure. TTE without effusion.      (19 Jul 2024 15:35)      PAST MEDICAL & SURGICAL HISTORY:  BPH (benign prostatic hyperplasia)      Nephrolithiasis      Bladder stone      Internal hemorrhoids      Colon polyps      H/O carpal tunnel syndrome      History of prostate surgery      Urinary bladder stone      S/P colonoscopy                ECHO  FINDINGS:      MEDICATIONS  (STANDING):  atorvastatin 20 milliGRAM(s) Oral at bedtime  escitalopram 5 milliGRAM(s) Oral daily    MEDICATIONS  (PRN):      FAMILY HISTORY:          REVIEW OF SYSTEMS:    CONSTITUTIONAL: No weakness, fevers or chills  EYES No eye pain, No visual changes;  ENT:  No vertigo or throat pain, no nasal congestion  NECK: No pain or stiffness  RESPIRATORY: No cough, wheezing, hemoptysis; No shortness of breath  CARDIOVASCULAR: No chest pain or palpitations  GASTROINTESTINAL: No abdominal or epigastric pain. No nausea, vomiting, or hematemesis; No diarrhea or constipation. No melena or hematochezia.  GENITOURINARY: No dysuria, frequency or hematuria  NEUROLOGICAL: No numbness or weakness  Psych: No anxiety, no depression  SKIN: No itching, rashes. RLE femoral site without tenderness, ecchymosis, or evidence of hematoma      Allergic/Immunologic:	  SOCIAL HISTORY:    CIGARETTES:    ALCOHOL:  Vital Signs Last 24 Hrs  T(C): 36.8 (02 Aug 2024 16:02), Max: 36.8 (02 Aug 2024 16:02)  T(F): 98.3 (02 Aug 2024 16:02), Max: 98.3 (02 Aug 2024 16:02)  HR: 74 (02 Aug 2024 17:30) (43 - 91)  BP: 100/60 (02 Aug 2024 17:30) (65/35 - 160/71)  BP(mean): 73 (02 Aug 2024 17:30) (45 - 102)  RR: 18 (02 Aug 2024 17:30) (16 - 20)  SpO2: 97% (02 Aug 2024 17:30) (90% - 98%)    Parameters below as of 02 Aug 2024 17:30  Patient On (Oxygen Delivery Method): nasal cannula  O2 Flow (L/min): 2      PHYSICAL EXAM:  GENERAL: NAD, Resting in bed  HEENT:  Head atraumatic, EOMI, PERRLA, conjunctiva and sclera clear; Moist mucous membranes, normal oropharynx  NECK: Supple, No JVD, no lymphadenopathy, no thyroid nodules or enlargement  CHEST/LUNG: Clear to auscultation bilaterally; No rales, rhonchi, wheezing, or rubs. Unlabored respirations on room air  HEART: Regular rate and rhythm; No murmurs, rubs, or gallops  ABDOMEN: Bowel sounds present; Soft, Nontender, Nondistended. No hepatomegally  EXTREMITIES:  2+ Peripheral Pulses, brisk capillary refill. No clubbing, cyanosis, or edema  NERVOUS SYSTEM:  Alert & Oriented X3, non-focal and spontaneous movements of all extremities  SKIN: No rashes or lesions    ECG:    I&O's Detail    02 Aug 2024 07:01  -  02 Aug 2024 17:38  --------------------------------------------------------  IN:  Total IN: 0 mL    OUT:    Voided (mL): 800 mL  Total OUT: 800 mL    Total NET: -800 mL          LABS:                        15.0   9.35  )-----------( 208      ( 02 Aug 2024 15:32 )             43.9     08-02    139  |  107  |  17  ----------------------------<  159<H>  4.1   |  16<L>  |  0.96    Ca    8.1<L>      02 Aug 2024 15:32            Urinalysis Basic - ( 02 Aug 2024 15:32 )    Color: x / Appearance: x / SG: x / pH: x  Gluc: 159 mg/dL / Ketone: x  / Bili: x / Urobili: x   Blood: x / Protein: x / Nitrite: x   Leuk Esterase: x / RBC: x / WBC x   Sq Epi: x / Non Sq Epi: x / Bacteria: x      I&O's Summary    02 Aug 2024 07:01  -  02 Aug 2024 17:38  --------------------------------------------------------  IN: 0 mL / OUT: 800 mL / NET: -800 mL      BNP  RADIOLOGY & ADDITIONAL STUDIES:

## 2024-08-02 NOTE — CHART NOTE - NSCHARTNOTEFT_GEN_A_CORE
Removal of Fem A line    Pulses in the left lower extremity are doppler.  The patient was placed in the supine position. The insertion site was identified and the sutures were removed per protocol.  The 4 [place] femoral sheath was then removed. Direct pressure was applied for  15 minutes.     Monitoring of the right groin and both lower extremities including neuro-vascular checks and vital signs every 15 minutes x 4, then every 30 minutes x 2, then every 1 hour x 2 and the every 4 hours was ordered.    Complications: No hematoma, no bleed    Education: medication adherence: in particular DAPT adherence with rational, groin care, signs and symptoms of groin complications reviewed with pt who verbalizes understanding. All questions answered.

## 2024-08-02 NOTE — CHART NOTE - NSCHARTNOTEFT_GEN_A_CORE
Patient s/p ablation with hypotension SBP to 60's , patient C/O nausea & cool and clammy.   68/40     40        16             Gen- awake and alert   Lungs - CTA b/l   Heart - S1, S2   Abd- soft , non tender  Ext - no edema       63 y/o M with PMHx of Afib, colon Polyps, BPH s/p Urolift procedure (10 years ago), Anxiety, s/p Cystoscopy, Bladder Stone Removal ,   - ECHO = no effusion  - CXR PA/LAT  - NS BOLUS   - zofran   - atropine 1 mg given  - CCU called   - Dr Dos Santos at bedside , A-line being placed

## 2024-08-02 NOTE — ASU DISCHARGE PLAN (ADULT/PEDIATRIC) - NS MD DC FALL RISK RISK
For information on Fall & Injury Prevention, visit: [email or web address] OR  [email or web address] OR  https://www.cdc.gov/steadi/patient.html

## 2024-08-02 NOTE — PRE-OP CHECKLIST - NOTHING BY MOUTH SINCE
CERTIFICATE OF WORK    September 10, 2023      Re:   Mary Anne Fernandez  [address] [facility] [address]                        This is to certify that Mary Anne Fernandez has been under my care from 9/6/2023 and is unable to return to work until reevaluation on 9/18/23.          SIGNATURE:___________________________________________,   9/10/2023                Taty Melchor PA-C  [facility] [place] 2ND FLOOR INPATIENT UNIT MEDICAL SURGICAL  [address] [facility] [address]  [phone]       01-Aug-2024 22:00

## 2024-08-02 NOTE — RAPID RESPONSE TEAM SUMMARY - NSSITUATIONBACKGROUNDRRT_GEN_ALL_CORE
63 y/o M with PMHx of Afib, colon Polyps, BPH s/p Urolift procedure (10 years ago), Anxiety, s/p Cystoscopy, Bladder Stone Removal with Dr. Hensley on 10/05/2023. Pt reports many years of palpitations, rare and intermittently, last 2 months occurring more frequently., saw a cardiologist, Dx: Afib. Pt denies c/p or SOB. Pt evaluated by Dr. Dos Santos s/p scheduled Afib ablation on 8/2/2024.    RRT called for hypotension. Patient with complains of nausea, clammy, and feeling unwell. Mentating AOx3 at baseline. Reportedly hypotensive post procedure, EP put in A-line and started dopamine gtt. Dopamine gtt was downtitrated from 10--> 2.5, which has vasodilatory effect. Dopamine increased to 5, then turned off. Patient maintaining SBP in the 120s at bedside. CICU at bedside, discussion to see if he can tolerate to be off the gtt and transferred to floors. Ongoing evaluation. Given current hemodynamic stability (off dopamine), RRT was concluded. Follow up EP and CICU recs.

## 2024-08-03 LAB
ANION GAP SERPL CALC-SCNC: 12 MMOL/L — SIGNIFICANT CHANGE UP (ref 5–17)
ANION GAP SERPL CALC-SCNC: 14 MMOL/L — SIGNIFICANT CHANGE UP (ref 5–17)
BUN SERPL-MCNC: 20 MG/DL — SIGNIFICANT CHANGE UP (ref 7–23)
BUN SERPL-MCNC: 21 MG/DL — SIGNIFICANT CHANGE UP (ref 7–23)
CALCIUM SERPL-MCNC: 8.2 MG/DL — LOW (ref 8.4–10.5)
CALCIUM SERPL-MCNC: 8.2 MG/DL — LOW (ref 8.4–10.5)
CHLORIDE SERPL-SCNC: 107 MMOL/L — SIGNIFICANT CHANGE UP (ref 96–108)
CHLORIDE SERPL-SCNC: 107 MMOL/L — SIGNIFICANT CHANGE UP (ref 96–108)
CO2 SERPL-SCNC: 20 MMOL/L — LOW (ref 22–31)
CO2 SERPL-SCNC: 21 MMOL/L — LOW (ref 22–31)
CREAT SERPL-MCNC: 1.01 MG/DL — SIGNIFICANT CHANGE UP (ref 0.5–1.3)
CREAT SERPL-MCNC: 1.04 MG/DL — SIGNIFICANT CHANGE UP (ref 0.5–1.3)
EGFR: 81 ML/MIN/1.73M2 — SIGNIFICANT CHANGE UP
EGFR: 84 ML/MIN/1.73M2 — SIGNIFICANT CHANGE UP
GLUCOSE SERPL-MCNC: 117 MG/DL — HIGH (ref 70–99)
GLUCOSE SERPL-MCNC: 133 MG/DL — HIGH (ref 70–99)
HCT VFR BLD CALC: 41.5 % — SIGNIFICANT CHANGE UP (ref 39–50)
HGB BLD-MCNC: 13.7 G/DL — SIGNIFICANT CHANGE UP (ref 13–17)
LACTATE BLDV-MCNC: 1.5 MMOL/L — SIGNIFICANT CHANGE UP (ref 0.5–2)
MAGNESIUM SERPL-MCNC: 1.7 MG/DL — SIGNIFICANT CHANGE UP (ref 1.6–2.6)
MCHC RBC-ENTMCNC: 28.8 PG — SIGNIFICANT CHANGE UP (ref 27–34)
MCHC RBC-ENTMCNC: 33 GM/DL — SIGNIFICANT CHANGE UP (ref 32–36)
MCV RBC AUTO: 87.2 FL — SIGNIFICANT CHANGE UP (ref 80–100)
NRBC # BLD: 0 /100 WBCS — SIGNIFICANT CHANGE UP (ref 0–0)
PLATELET # BLD AUTO: 214 K/UL — SIGNIFICANT CHANGE UP (ref 150–400)
POTASSIUM SERPL-MCNC: 3.8 MMOL/L — SIGNIFICANT CHANGE UP (ref 3.5–5.3)
POTASSIUM SERPL-MCNC: 3.9 MMOL/L — SIGNIFICANT CHANGE UP (ref 3.5–5.3)
POTASSIUM SERPL-SCNC: 3.8 MMOL/L — SIGNIFICANT CHANGE UP (ref 3.5–5.3)
POTASSIUM SERPL-SCNC: 3.9 MMOL/L — SIGNIFICANT CHANGE UP (ref 3.5–5.3)
RBC # BLD: 4.76 M/UL — SIGNIFICANT CHANGE UP (ref 4.2–5.8)
RBC # FLD: 13.2 % — SIGNIFICANT CHANGE UP (ref 10.3–14.5)
SODIUM SERPL-SCNC: 140 MMOL/L — SIGNIFICANT CHANGE UP (ref 135–145)
SODIUM SERPL-SCNC: 141 MMOL/L — SIGNIFICANT CHANGE UP (ref 135–145)
WBC # BLD: 13.67 K/UL — HIGH (ref 3.8–10.5)
WBC # FLD AUTO: 13.67 K/UL — HIGH (ref 3.8–10.5)

## 2024-08-03 PROCEDURE — 93010 ELECTROCARDIOGRAM REPORT: CPT

## 2024-08-03 PROCEDURE — 99232 SBSQ HOSP IP/OBS MODERATE 35: CPT

## 2024-08-03 RX ORDER — BACTERIOSTATIC SODIUM CHLORIDE 0.9 %
500 VIAL (ML) INJECTION ONCE
Refills: 0 | Status: COMPLETED | OUTPATIENT
Start: 2024-08-03 | End: 2024-08-03

## 2024-08-03 RX ORDER — BENZOCAINE AND MENTHOL 5; 1 G/100ML; G/100ML
1 LIQUID ORAL ONCE
Refills: 0 | Status: COMPLETED | OUTPATIENT
Start: 2024-08-03 | End: 2024-08-03

## 2024-08-03 RX ORDER — ACETAMINOPHEN 500 MG
650 TABLET ORAL EVERY 6 HOURS
Refills: 0 | Status: DISCONTINUED | OUTPATIENT
Start: 2024-08-03 | End: 2024-08-05

## 2024-08-03 RX ORDER — BACTERIOSTATIC SODIUM CHLORIDE 0.9 %
1000 VIAL (ML) INJECTION
Refills: 0 | Status: DISCONTINUED | OUTPATIENT
Start: 2024-08-03 | End: 2024-08-05

## 2024-08-03 RX ORDER — BACTERIOSTATIC SODIUM CHLORIDE 0.9 %
250 VIAL (ML) INJECTION ONCE
Refills: 0 | Status: COMPLETED | OUTPATIENT
Start: 2024-08-03 | End: 2024-08-03

## 2024-08-03 RX ADMIN — Medication 500 MILLILITER(S): at 06:39

## 2024-08-03 RX ADMIN — Medication 5 MILLIGRAM(S): at 22:06

## 2024-08-03 RX ADMIN — Medication 650 MILLIGRAM(S): at 06:01

## 2024-08-03 RX ADMIN — Medication 75 MILLILITER(S): at 13:12

## 2024-08-03 RX ADMIN — BENZOCAINE AND MENTHOL 1 LOZENGE: 5; 1 LIQUID ORAL at 06:01

## 2024-08-03 RX ADMIN — Medication 650 MILLIGRAM(S): at 07:00

## 2024-08-03 RX ADMIN — Medication 1000 MILLILITER(S): at 06:02

## 2024-08-03 RX ADMIN — APIXABAN 5 MILLIGRAM(S): 5 TABLET, FILM COATED ORAL at 09:15

## 2024-08-03 RX ADMIN — APIXABAN 5 MILLIGRAM(S): 5 TABLET, FILM COATED ORAL at 22:05

## 2024-08-03 RX ADMIN — Medication 500 MILLILITER(S): at 10:07

## 2024-08-03 RX ADMIN — ATORVASTATIN CALCIUM 20 MILLIGRAM(S): 40 TABLET, FILM COATED ORAL at 22:05

## 2024-08-03 NOTE — DISCHARGE NOTE PROVIDER - NSDCFUADDAPPT_GEN_ALL_CORE_FT
APPTS ARE READY TO BE MADE: [X] YES    Best Family or Patient Contact (if needed):    Additional Information about above appointments (if needed):    1:   2:   3:     Other comments or requests:    APPTS ARE READY TO BE MADE: [X] YES    Best Family or Patient Contact (if needed):    Additional Information about above appointments (if needed):    1:   2:   3:     Other comments or requests:     Cardiac Electrophysiology:  Prior to outreaching the patient, it was visible that the patient has secured a follow up appointment which was not scheduled by our team.  Dr. Dos Santos 09/24/2024 at 8:30 AM [address] [place] [address]    APPTS ARE READY TO BE MADE: [X] YES    Best Family or Patient Contact (if needed):    Additional Information about above appointments (if needed):    1:   2:   3:     Other comments or requests:     INTERNAL MEDICINE:  Appointment was scheduled by our team on the patient's behalf through the provider's office. Dr. Quinten Ward Appointment: 8/08/2024 at 9:00 AM    [address] [place], [place] [address] [phone]    Cardiac Electrophysiology:  Prior to outreaching the patient, it was visible that the patient has secured a follow up appointment which was not scheduled by our team.  Dr. Dos Santos Appointment: 09/24/2024 at 8:30 AM   [address] [place] [address]

## 2024-08-03 NOTE — PROVIDER CONTACT NOTE (OTHER) - ASSESSMENT
pt hypotensive and c/o of dizziness and diaphoresis s/p walking. Pt groin sites remain stable. No bleeding or hematoma noted. Patient placed in bed.

## 2024-08-03 NOTE — DISCHARGE NOTE PROVIDER - NSDCFUSCHEDAPPT_GEN_ALL_CORE_FT
Bhupendra Dos Santos  [facility]  ELECTROPH [address]  Scheduled Appointment: 09/24/2024

## 2024-08-03 NOTE — DISCHARGE NOTE PROVIDER - CARE PROVIDERS DIRECT ADDRESSES
[email or web address]

## 2024-08-03 NOTE — DISCHARGE NOTE PROVIDER - NSDCCPCAREPLAN_GEN_ALL_CORE_FT
PRINCIPAL DISCHARGE DIAGNOSIS  Diagnosis: Atrial fibrillation  Assessment and Plan of Treatment: Atrial fibrillation is a condition in which your heart's upper chambers (atria) beat too quickly. This causes the heart to beat in a fast, irregular rhythm. Atrial fibrillation is a type of heart rhythm disorder (arrhythmia) caused by problems in your heart's electrical system. Without treatment, atrial fibrillation/flutter can also cause a fast pulse rate for long periods of time. This means that the ventricles are beating too fast, and the heart muscle can become weak. This can lead to heart failure and long-term disability.  You underwent a successful atrial fibrillation ablation on 8/2. The procedure was done through the groin. Please avoid any heavy lifting (no more than 3 to 5 lbs), strenuous activity, bending, straining, or unnecessary stair climbing for 2 weeks. No driving for 2 days. You may shower 24 hours following the procedure but avoid baths/swimming for 1 week. If you develop any swelling, bleeding, hardening of the skin (hematoma formation), acute pain, numbness/tingling in your leg, or have any questions/concerns regarding your procedure, please call [facility] [phone]  Take all medications as prescribed. Limit your intake of coffee, tea, cola, and other beverages with caffeine. Talk with your doctor about whether you should eliminate caffeine. Avoid over-the-counter medicines that have caffeine in them.  Follow up with your cardologist within 1 week of discharge.

## 2024-08-03 NOTE — DISCHARGE NOTE PROVIDER - NSDCCPTREATMENT_GEN_ALL_CORE_FT
PRINCIPAL PROCEDURE  Procedure: Cardiac ablation  Findings and Treatment: 8/2 s/p afib ablation via RFV w/ vascade x3

## 2024-08-03 NOTE — DISCHARGE NOTE PROVIDER - HOSPITAL COURSE
HPI: 63 y/o M with PMHx of Afib, colon Polyps, BPH s/p Urolift procedure (10 years ago), Anxiety, s/p Cystoscopy, Bladder Stone Removal with Dr. Hensley on 10/05/2023. Pt reports many years of palpitations, rare and intermittently, last 2 months occurring more frequently., saw a cardiologist, Dx: Afib. Pt denies c/p or SOB. Pt s/p afib ablation via RFV w/ vascade x3  63 y/o M with PMHx of Afib, colon Polyps, BPH s/p Urolift procedure (10 years ago), Anxiety, s/p Cystoscopy, Bladder Stone Removal with Dr. Hensley on 10/05/2023. Pt reports many years of palpitations, rare and intermittently, last 2 months occurring more frequently., saw a cardiologist, Dx: Afib. Pt denies c/p or SOB.   8/2/24: Pt s/p afib ablation via RFV w/ vascade x3  Hospital course complicated by post-procedure symptomatic hypotension. Periods of symptomatic hypotension were evident 8/3/24.  Kept in hospital for monitoring.   On evaluation 8/4/24, hypotension appears resolved. No orthostatic hypotension evident when transitioning from supine to standing. Patient ambulating without dizziness, presyncope, or weakness  Cleared by EP and cardiology for discharge  63 y/o M with PMHx of Afib, colon Polyps, BPH s/p Urolift procedure (10 years ago), Anxiety, s/p Cystoscopy, Bladder Stone Removal with Dr. Hensley on 10/05/2023. Pt reports many years of palpitations, rare and intermittently, last 2 months occurring more frequently., saw a cardiologist, Dx: Afib. Pt denies c/p or SOB.   8/2/24: Pt s/p afib ablation via RFV w/ vascade x3  Hospital course complicated by post-procedure symptomatic hypotension. Periods of symptomatic hypotension were evident 8/3/24.  Kept in hospital for monitoring.   On evaluation 8/4/24, hypotension appears resolved. No orthostatic hypotension evident when transitioning from supine to standing. Patient ambulating without dizziness, presyncope, or weakness  Cleared by EP and cardiology for discharge on 8/4/24 - Discharge cancelled for Hypoxia with ambulation (SaO2 88% on room air)  Evaluation including Chest Xray (AP / Lat), D-Dimer with no acute findings.  Incentive Spirometry for possible atelectasis with improvement in SaO2  Cleared for discharge 8/5/24 by Medicine, EP and Cardiology

## 2024-08-03 NOTE — DISCHARGE NOTE PROVIDER - NSDCMRMEDTOKEN_GEN_ALL_CORE_FT
Eliquis 5 mg oral tablet: 1 tab(s) orally every 12 hours  Lexapro 5 mg oral tablet: 0.5 tab(s) orally once a day (at bedtime)  metoprolol succinate 25 mg oral tablet, extended release: 1 tab(s) orally once a day (at bedtime)  rosuvastatin 5 mg oral tablet: 1 tab(s) orally once a day (at bedtime)  Sonata 10 mg oral capsule: 1 cap(s) orally once a day (at bedtime) as needed for as needed

## 2024-08-03 NOTE — DISCHARGE NOTE PROVIDER - CARE PROVIDER_API CALL
Bhupendra Dos Santos.  Cardiac Electrophysiology  [address] [place] [id number]  Phone: [phone]  Fax: [phone]  Scheduled Appointment: 09/24/2024 08:30 AM   Bhupendra Dos Santos.  Cardiac Electrophysiology  [address] [place] [address]  Phone: [phone]  Fax: [phone]  Scheduled Appointment: 09/24/2024 08:30 AM    Quinten Ward  Internal Medicine  [address] [place] [address], [address]  [place] [address]  Phone: [phone]  Fax: [phone]  Follow Up Time:

## 2024-08-03 NOTE — DISCHARGE NOTE PROVIDER - PROVIDER TOKENS
PROVIDER:[TOKEN:[[id number]],SCHEDULEDAPPT:[09/24/2024],SCHEDULEDAPPTTIME:[08:30 AM]] PROVIDER:[TOKEN:[[id number]],SCHEDULEDAPPT:[09/24/2024],SCHEDULEDAPPTTIME:[08:30 AM]],PROVIDER:[TOKEN:[[id number]]]

## 2024-08-03 NOTE — PROVIDER CONTACT NOTE (OTHER) - ACTION/TREATMENT ORDERED:
Zane Mcclure NP at bedside. 250 cc bolus ordered. Fellow called to assess patient at bedside. Pt to remain on bedrest overnight

## 2024-08-04 LAB
ANION GAP SERPL CALC-SCNC: 11 MMOL/L — SIGNIFICANT CHANGE UP (ref 5–17)
BUN SERPL-MCNC: 16 MG/DL — SIGNIFICANT CHANGE UP (ref 7–23)
CALCIUM SERPL-MCNC: 8.7 MG/DL — SIGNIFICANT CHANGE UP (ref 8.4–10.5)
CHLORIDE SERPL-SCNC: 105 MMOL/L — SIGNIFICANT CHANGE UP (ref 96–108)
CO2 SERPL-SCNC: 20 MMOL/L — LOW (ref 22–31)
CREAT SERPL-MCNC: 0.92 MG/DL — SIGNIFICANT CHANGE UP (ref 0.5–1.3)
D DIMER BLD IA.RAPID-MCNC: 271 NG/ML DDU — HIGH
EGFR: 94 ML/MIN/1.73M2 — SIGNIFICANT CHANGE UP
GLUCOSE SERPL-MCNC: 117 MG/DL — HIGH (ref 70–99)
HCT VFR BLD CALC: 38.8 % — LOW (ref 39–50)
HGB BLD-MCNC: 12.7 G/DL — LOW (ref 13–17)
MAGNESIUM SERPL-MCNC: 2 MG/DL — SIGNIFICANT CHANGE UP (ref 1.6–2.6)
MCHC RBC-ENTMCNC: 29 PG — SIGNIFICANT CHANGE UP (ref 27–34)
MCHC RBC-ENTMCNC: 32.7 GM/DL — SIGNIFICANT CHANGE UP (ref 32–36)
MCV RBC AUTO: 88.6 FL — SIGNIFICANT CHANGE UP (ref 80–100)
NRBC # BLD: 0 /100 WBCS — SIGNIFICANT CHANGE UP (ref 0–0)
PLATELET # BLD AUTO: 160 K/UL — SIGNIFICANT CHANGE UP (ref 150–400)
POTASSIUM SERPL-MCNC: 3.8 MMOL/L — SIGNIFICANT CHANGE UP (ref 3.5–5.3)
POTASSIUM SERPL-SCNC: 3.8 MMOL/L — SIGNIFICANT CHANGE UP (ref 3.5–5.3)
RBC # BLD: 4.38 M/UL — SIGNIFICANT CHANGE UP (ref 4.2–5.8)
RBC # FLD: 13.6 % — SIGNIFICANT CHANGE UP (ref 10.3–14.5)
SODIUM SERPL-SCNC: 136 MMOL/L — SIGNIFICANT CHANGE UP (ref 135–145)
WBC # BLD: 12.79 K/UL — HIGH (ref 3.8–10.5)
WBC # FLD AUTO: 12.79 K/UL — HIGH (ref 3.8–10.5)

## 2024-08-04 PROCEDURE — 71045 X-RAY EXAM CHEST 1 VIEW: CPT | Mod: 26

## 2024-08-04 RX ADMIN — APIXABAN 5 MILLIGRAM(S): 5 TABLET, FILM COATED ORAL at 21:40

## 2024-08-04 RX ADMIN — ATORVASTATIN CALCIUM 20 MILLIGRAM(S): 40 TABLET, FILM COATED ORAL at 21:39

## 2024-08-04 RX ADMIN — APIXABAN 5 MILLIGRAM(S): 5 TABLET, FILM COATED ORAL at 08:52

## 2024-08-04 RX ADMIN — Medication 5 MILLIGRAM(S): at 21:39

## 2024-08-04 NOTE — CHART NOTE - NSCHARTNOTEFT_GEN_A_CORE
Discharge was completed as directed by Dr Bond.  Patient no longer hypotensive, feels "great"  ** Vital signs with SaO2 88% on room air with ambulation / (90 - 92% at rest on room air)  Patient denies SOB  Chest CTA bilat  CXR obtained - "small left pleural effusion"  Dr Bond and Dr Grimaldo (Cards) made aware  Would hold off on diuretics at this time due to symptomatic hypotension requiring IVF boluses yesterday  Patient already on Eliquis (started 8/2/24) - 5mg BID  Will send D-Dimer -   Continue tele / continuous Pulse Oximetry   Patient reporting increased "clear" urine output "over last few hours"  Discharge Cancelled

## 2024-08-04 NOTE — CONSULT NOTE ADULT - SUBJECTIVE AND OBJECTIVE BOX
DATE OF SERVICE: 08-04-24 @ 17:48    CHIEF COMPLAINT:Patient is a 64y old  Male who presents with a chief complaint of Afib Ablation (03 Aug 2024 03:12)      HISTORY OF PRESENT ILLNESS:HPI:  65 y/o M with PMHx of Afib, colon Polyps, BPH s/p Urolift procedure (10 years ago), Anxiety, s/p Cystoscopy, Bladder Stone Removal with Dr. Hensley on 10/05/2023. Pt reports many years of palpitations, rare and intermittently, last 2 months occurring more frequently., saw a cardiologist, Dx: Afib. Pt denies c/p or SOB. Pt evaluated by Dr. Dos Santos for a scheduled Afib ablation on 8/2/2024.     (19 Jul 2024 15:35)      PAST MEDICAL & SURGICAL HISTORY:  BPH (benign prostatic hyperplasia)      Nephrolithiasis      Bladder stone      Internal hemorrhoids      Colon polyps      H/O carpal tunnel syndrome      History of prostate surgery      Urinary bladder stone      S/P colonoscopy              MEDICATIONS:  apixaban 5 milliGRAM(s) Oral every 12 hours        acetaminophen     Tablet .. 650 milliGRAM(s) Oral every 6 hours PRN  escitalopram 5 milliGRAM(s) Oral daily      atorvastatin 20 milliGRAM(s) Oral at bedtime    sodium chloride 0.9%. 1000 milliLiter(s) IV Continuous <Continuous>      FAMILY HISTORY:      Non-contributory    SOCIAL HISTORY:    [ ] not a smoker    Allergies    No Known Drug Allergies  Onions (Flushing; Nausea)    Intolerances    	    REVIEW OF SYSTEMS:  CONSTITUTIONAL: No fever  EYES: No eye pain, visual disturbances, or discharge  ENMT:  No difficulty hearing, tinnitus  NECK: No pain or stiffness  RESPIRATORY: No cough, wheezing,  CARDIOVASCULAR: No chest pain, palpitations, passing out, dizziness, or leg swelling  GASTROINTESTINAL:  No nausea, vomiting, diarrhea or constipation. No melena.  GENITOURINARY: No dysuria, hematuria  NEUROLOGICAL: No stroke like symptoms  SKIN: No burning or lesions   ENDOCRINE: No heat or cold intolerance  MUSCULOSKELETAL: No joint pain or swelling  PSYCHIATRIC: No  anxiety, mood swings  HEME/LYMPH: No bleeding gums  ALLERGY AND IMMUNOLOGIC: No hives or eczema	    All other ROS negative    PHYSICAL EXAM:  T(C): 36.6 (08-04-24 @ 11:19), Max: 37.4 (08-03-24 @ 20:16)  HR: 66 (08-04-24 @ 11:19) (66 - 73)  BP: 131/84 (08-04-24 @ 11:19) (119/77 - 135/82)  RR: 19 (08-04-24 @ 13:08) (18 - 22)  SpO2: 88% (08-04-24 @ 13:08) (88% - 94%)  Wt(kg): --  I&O's Summary    03 Aug 2024 07:01  -  04 Aug 2024 07:00  --------------------------------------------------------  IN: 480 mL / OUT: 900 mL / NET: -420 mL        Appearance: Normal	  HEENT:   Normal oral mucosa, EOMI	  Cardiovascular:  S1 S2, No JVD,    Respiratory: Lungs clear to auscultation	  Psychiatry: Alert  Gastrointestinal:  Soft, Non-tender, + BS	  Skin: No rashes   Neurologic: Non-focal  Extremities:  No edema  Vascular: Peripheral pulses palpable    	    	  	  CARDIAC MARKERS:  Labs personally reviewed by me                                  12.7   12.79 )-----------( 160      ( 04 Aug 2024 05:50 )             38.8     08-04    136  |  105  |  16  ----------------------------<  117<H>  3.8   |  20<L>  |  0.92    Ca    8.7      04 Aug 2024 05:50  Mg     2.0     08-04            EKG: Personally reviewed by me - NSR LAD  Radiology: Personally reviewed by me -  CXR Small left pleural effusion            Assessment /Plan:       Differential diagnosis and plan of care discussed with patient after the evaluation. Counseling on diet, nutritional counseling, weight management, exercise and medication compliance was done.   Advanced care planning/advanced directives discussed with patient/family. DNR status including forceful chest compressions to attempt to restart the heart, ventilator support/artificial breathing, electric shock, artificial nutrition, health care proxy, Molst form all discussed with pt. Pt wishes to consider. Sixteen minutes spent on discussing advanced directives.       Omar Grimaldo DO [facility]  Cardiovascular Medicine  [address] [place] [address], [address]  Office [phone]  Available via call/text on [employer] Teams DATE OF SERVICE: 08-04-24 @ 17:48    CHIEF COMPLAINT:Patient is a 64y old  Male who presents with a chief complaint of Afib Ablation (03 Aug 2024 03:12)      HISTORY OF PRESENT ILLNESS:HPI:  63 y/o M with PMHx of Afib, colon Polyps, BPH s/p Urolift procedure (10 years ago), Anxiety, s/p Cystoscopy, Bladder Stone Removal with Dr. Hensley on 10/05/2023. Pt reports many years of palpitations, rare and intermittently, last 2 months occurring more frequently., saw a cardiologist, Dx: Afib. Pt denies c/p or SOB. Pt evaluated by Dr. Dos Santos for a scheduled Afib ablation on 8/2/2024.     (19 Jul 2024 15:35)      PAST MEDICAL & SURGICAL HISTORY:  BPH (benign prostatic hyperplasia)      Nephrolithiasis      Bladder stone      Internal hemorrhoids      Colon polyps      H/O carpal tunnel syndrome      History of prostate surgery      Urinary bladder stone      S/P colonoscopy              MEDICATIONS:  apixaban 5 milliGRAM(s) Oral every 12 hours        acetaminophen     Tablet .. 650 milliGRAM(s) Oral every 6 hours PRN  escitalopram 5 milliGRAM(s) Oral daily      atorvastatin 20 milliGRAM(s) Oral at bedtime    sodium chloride 0.9%. 1000 milliLiter(s) IV Continuous <Continuous>      FAMILY HISTORY:      Non-contributory    SOCIAL HISTORY:    [ ] not a smoker    Allergies    No Known Drug Allergies  Onions (Flushing; Nausea)    Intolerances    	    REVIEW OF SYSTEMS:  CONSTITUTIONAL: No fever  EYES: No eye pain, visual disturbances, or discharge  ENMT:  No difficulty hearing, tinnitus  NECK: No pain or stiffness  RESPIRATORY: No cough, wheezing,  CARDIOVASCULAR: No chest pain, palpitations, passing out, dizziness, or leg swelling  GASTROINTESTINAL:  No nausea, vomiting, diarrhea or constipation. No melena.  GENITOURINARY: No dysuria, hematuria  NEUROLOGICAL: No stroke like symptoms  SKIN: No burning or lesions   ENDOCRINE: No heat or cold intolerance  MUSCULOSKELETAL: No joint pain or swelling  PSYCHIATRIC: No  anxiety, mood swings  HEME/LYMPH: No bleeding gums  ALLERGY AND IMMUNOLOGIC: No hives or eczema	    All other ROS negative    PHYSICAL EXAM:  T(C): 36.6 (08-04-24 @ 11:19), Max: 37.4 (08-03-24 @ 20:16)  HR: 66 (08-04-24 @ 11:19) (66 - 73)  BP: 131/84 (08-04-24 @ 11:19) (119/77 - 135/82)  RR: 19 (08-04-24 @ 13:08) (18 - 22)  SpO2: 88% (08-04-24 @ 13:08) (88% - 94%)  Wt(kg): --  I&O's Summary    03 Aug 2024 07:01  -  04 Aug 2024 07:00  --------------------------------------------------------  IN: 480 mL / OUT: 900 mL / NET: -420 mL        Appearance: Normal	  HEENT:   Normal oral mucosa, EOMI	  Cardiovascular:  S1 S2, No JVD,    Respiratory: Lungs clear to auscultation	  Psychiatry: Alert  Gastrointestinal:  Soft, Non-tender, + BS	  Skin: No rashes   Neurologic: Non-focal  Extremities:  No edema  Vascular: Peripheral pulses palpable    	    	  	  CARDIAC MARKERS:  Labs personally reviewed by me                                  12.7   12.79 )-----------( 160      ( 04 Aug 2024 05:50 )             38.8     08-04    136  |  105  |  16  ----------------------------<  117<H>  3.8   |  20<L>  |  0.92    Ca    8.7      04 Aug 2024 05:50  Mg     2.0     08-04            EKG: Personally reviewed by me - NSR LAD  Radiology: Personally reviewed by me -  CXR Small left pleural effusion          ECHO: 8/2/24  CONCLUSIONS:   1. Left ventricular systolic function is normal.   2. No pericardial effusion seen.    6/25/24:  CONCLUSIONS:   1. Left ventricular cavity is normal in size. Left ventricular wall thickness is normal. Left ventricular systolic function is normal with an ejection fraction of 57 % by 3D. There are no regional wall motion abnormalities seen.   2. Normalleft ventricular diastolic function, with normal filling pressure.   3. Normal right ventricular cavity size, with normal wall thickness, and normal systolic function. Tricuspid annular plane systolic excursion (TAPSE) is 2.1 cm (normal >=1.7 cm).   4. No pericardial effusion seen.        Assessment and Plan:   · Assessment	  HPI: 63 y/o M with PMHx of Afib, colon Polyps, BPH s/p Urolift procedure (10 years ago), Anxiety, s/p Cystoscopy, Bladder Stone Removal with Dr. Hensley on 10/05/2023. Pt reports many years of palpitations, rare and intermittently, last 2 months occurring more frequently., saw a cardiologist, Dx: Afib. Pt denies c/p or SOB. Now s/p afib ablation    1. Atrial Fibrillation  8/2 s/p afib ablation via RFV     Eliquis 5 mg BID  Follow up w/ post procedure EP appt    2. HLD  Continue Atorvastatin 20 mg daily     3. Hypotension  Now resolved    4. Hypoxia  - unclear etiology  - ?mild overload  - now auto diuresing   - less likely PE but on AC regardless         Differential diagnosis and plan of care discussed with patient after the evaluation. Counseling on diet, nutritional counseling, weight management, exercise and medication compliance was done.   Advanced care planning/advanced directives discussed with patient/family. DNR status including forceful chest compressions to attempt to restart the heart, ventilator support/artificial breathing, electric shock, artificial nutrition, health care proxy, Molst form all discussed with pt. Pt wishes to consider. Sixteen minutes spent on discussing advanced directives.       Omar Grimaldo DO [facility]  Cardiovascular Medicine  [address], [address]  Office [phone]  Available via call/text on [employer] Teams

## 2024-08-04 NOTE — CHART NOTE - NSCHARTNOTEFT_GEN_A_CORE
65 y/o M with PMHx of Afib, colon Polyps, BPH s/p Urolift procedure (10 years ago), Anxiety, s/p Cystoscopy, Bladder Stone Removal with Dr. Hensley on 10/05/2023. Pt is s/p Afib ablation on 8/2/2024 with Dr. Dos Santos. Hospital course complicated by post-procedure symptomatic hypotension. On evaluation today, hypotension appears resolved. No orthostatic hypotension evident when transitioning from supine to standing. Patient ambulating without dizziness, presyncope, or weakness.     -continue anticoagulation  -OK for discharge from a EP standpoint 65 y/o M with PMHx of Afib, colon Polyps, BPH s/p Urolift procedure (10 years ago), Anxiety, s/p Cystoscopy, Bladder Stone Removal with Dr. Hensley on 10/05/2023. Pt is s/p Afib ablation on 8/2/2024 with Dr. Dos Santos. Hospital course complicated by post-procedure symptomatic hypotension. Periods of symptomatic hypotension were evident yesterday. On evaluation today, hypotension appears resolved. No orthostatic hypotension evident when transitioning from supine to standing. Patient ambulating without dizziness, presyncope, or weakness.     -continue anticoagulation  -OK for discharge from a EP standpoint

## 2024-08-04 NOTE — DISCHARGE NOTE NURSING/CASE MANAGEMENT/SOCIAL WORK - PATIENT PORTAL LINK FT
You can access the FollowMyHealth Patient Portal offered by [facility] by registering at the following website: [email or web address]. By joining [email or web address]’s FollowMyHealth portal, you will also be able to view your health information using other applications (apps) compatible with our system.

## 2024-08-05 VITALS
TEMPERATURE: 99 F | RESPIRATION RATE: 18 BRPM | SYSTOLIC BLOOD PRESSURE: 128 MMHG | OXYGEN SATURATION: 94 % | HEART RATE: 66 BPM | DIASTOLIC BLOOD PRESSURE: 78 MMHG

## 2024-08-05 LAB
ANION GAP SERPL CALC-SCNC: 14 MMOL/L — SIGNIFICANT CHANGE UP (ref 5–17)
BUN SERPL-MCNC: 13 MG/DL — SIGNIFICANT CHANGE UP (ref 7–23)
CALCIUM SERPL-MCNC: 8.9 MG/DL — SIGNIFICANT CHANGE UP (ref 8.4–10.5)
CHLORIDE SERPL-SCNC: 105 MMOL/L — SIGNIFICANT CHANGE UP (ref 96–108)
CO2 SERPL-SCNC: 20 MMOL/L — LOW (ref 22–31)
CREAT SERPL-MCNC: 0.88 MG/DL — SIGNIFICANT CHANGE UP (ref 0.5–1.3)
EGFR: 97 ML/MIN/1.73M2 — SIGNIFICANT CHANGE UP
GLUCOSE SERPL-MCNC: 97 MG/DL — SIGNIFICANT CHANGE UP (ref 70–99)
HCT VFR BLD CALC: 41 % — SIGNIFICANT CHANGE UP (ref 39–50)
HGB BLD-MCNC: 13.3 G/DL — SIGNIFICANT CHANGE UP (ref 13–17)
MAGNESIUM SERPL-MCNC: 2.3 MG/DL — SIGNIFICANT CHANGE UP (ref 1.6–2.6)
MCHC RBC-ENTMCNC: 28.7 PG — SIGNIFICANT CHANGE UP (ref 27–34)
MCHC RBC-ENTMCNC: 32.4 GM/DL — SIGNIFICANT CHANGE UP (ref 32–36)
MCV RBC AUTO: 88.4 FL — SIGNIFICANT CHANGE UP (ref 80–100)
NRBC # BLD: 0 /100 WBCS — SIGNIFICANT CHANGE UP (ref 0–0)
PLATELET # BLD AUTO: 176 K/UL — SIGNIFICANT CHANGE UP (ref 150–400)
POTASSIUM SERPL-MCNC: 4 MMOL/L — SIGNIFICANT CHANGE UP (ref 3.5–5.3)
POTASSIUM SERPL-SCNC: 4 MMOL/L — SIGNIFICANT CHANGE UP (ref 3.5–5.3)
RBC # BLD: 4.64 M/UL — SIGNIFICANT CHANGE UP (ref 4.2–5.8)
RBC # FLD: 13.4 % — SIGNIFICANT CHANGE UP (ref 10.3–14.5)
SODIUM SERPL-SCNC: 139 MMOL/L — SIGNIFICANT CHANGE UP (ref 135–145)
WBC # BLD: 9.75 K/UL — SIGNIFICANT CHANGE UP (ref 3.8–10.5)
WBC # FLD AUTO: 9.75 K/UL — SIGNIFICANT CHANGE UP (ref 3.8–10.5)

## 2024-08-05 PROCEDURE — 71045 X-RAY EXAM CHEST 1 VIEW: CPT

## 2024-08-05 PROCEDURE — C1759: CPT

## 2024-08-05 PROCEDURE — C1730: CPT

## 2024-08-05 PROCEDURE — 93657 TX L/R ATRIAL FIB ADDL: CPT

## 2024-08-05 PROCEDURE — 85027 COMPLETE CBC AUTOMATED: CPT

## 2024-08-05 PROCEDURE — C1766: CPT

## 2024-08-05 PROCEDURE — 80048 BASIC METABOLIC PNL TOTAL CA: CPT

## 2024-08-05 PROCEDURE — C1732: CPT

## 2024-08-05 PROCEDURE — 36415 COLL VENOUS BLD VENIPUNCTURE: CPT

## 2024-08-05 PROCEDURE — 93005 ELECTROCARDIOGRAM TRACING: CPT

## 2024-08-05 PROCEDURE — 85379 FIBRIN DEGRADATION QUANT: CPT

## 2024-08-05 PROCEDURE — C1894: CPT

## 2024-08-05 PROCEDURE — 93308 TTE F-UP OR LMTD: CPT

## 2024-08-05 PROCEDURE — 93656 COMPRE EP EVAL ABLTJ ATR FIB: CPT

## 2024-08-05 PROCEDURE — C9399: CPT

## 2024-08-05 PROCEDURE — 83605 ASSAY OF LACTIC ACID: CPT

## 2024-08-05 PROCEDURE — 82962 GLUCOSE BLOOD TEST: CPT

## 2024-08-05 PROCEDURE — 93623 PRGRMD STIMJ&PACG IV RX NFS: CPT

## 2024-08-05 PROCEDURE — C1760: CPT

## 2024-08-05 PROCEDURE — 83735 ASSAY OF MAGNESIUM: CPT

## 2024-08-05 PROCEDURE — 71045 X-RAY EXAM CHEST 1 VIEW: CPT | Mod: 26

## 2024-08-05 PROCEDURE — C1889: CPT

## 2024-08-05 RX ORDER — LORATADINE 10 MG
17 TABLET,DISINTEGRATING ORAL
Refills: 0 | Status: DISCONTINUED | OUTPATIENT
Start: 2024-08-05 | End: 2024-08-05

## 2024-08-05 RX ADMIN — APIXABAN 5 MILLIGRAM(S): 5 TABLET, FILM COATED ORAL at 10:22

## 2024-08-05 NOTE — PROGRESS NOTE ADULT - ASSESSMENT
63 y/o M with PMHx of Afib, colon Polyps, BPH s/p Urolift procedure (10 years ago), Anxiety, s/p Cystoscopy, Bladder Stone Removal with Dr. Hensley on 10/05/2023. Pt is s/p Afib ablation on 8/2/2024 with Dr. Dos Santos. Hospital course complicated by post-procedure symptomatic hypotension, thought to be 2/2 orthostasis, improved after IVF. Pt with mild hypoxia to 88% with ambulation on 8/4. Pt completely asymptomatic. O2 sats spontaneously improved to mid 90s even with ambulation on 8/5, mild hypoxia possibly 2/2 the fluids that were given for orthostasis. TTE without pericardial effusion, LVEF normal. Inspiratory/expiratory CXR without e/o diaphragmatic paralysis. Pt feeling well and ok for discharge.     -continue Eliquis 5mg BID  -OK for discharge from a EP standpoint  -D/w EP attending and primary team
63 y/o M with PMHx of Afib, colon Polyps, BPH s/p Urolift procedure (10 years ago), Anxiety, s/p Cystoscopy, Bladder Stone Removal with Dr. Hensley on 10/05/2023. Pt reports many years of palpitations, rare and intermittently, last 2 months occurring more frequently., saw a cardiologist, Dx: Afib. Pt denies c/p or SOB. Now s/p afib ablation    Plan:    # Afib:  - 8/2 s/p afib ablation via RFV w/ vascade x3  - Resume Eliquis 5 mg BID  - Continue monitoring telemetry  - Cards following    # Post op Hypotension:  - Trend BP's  - Orthos negative  - S/p IVF boluses  - Continue to monitor    # HLD:  - C/w Statin    # Depression  - C/w Lexapro    # Gi:  - Bowel regimen prn    # DVt ppx:  - Eliquis    [employer]  [phone]  
65 y/o M with PMHx of Afib, colon Polyps, BPH s/p Urolift procedure (10 years ago), Anxiety, s/p Cystoscopy, Bladder Stone Removal with Dr. Hensley on 10/05/2023. Pt reports many years of palpitations, rare and intermittently, last 2 months occurring more frequently., saw a cardiologist, Dx: Afib. Pt denies c/p or SOB. Now s/p afib ablation    Plan:    # Afib:  - 8/2 s/p afib ablation via RFV w/ vascade x3  - Resume Eliquis 5 mg BID  - Continue monitoring telemetry  - Cards following    # Post op Hypoxia:  unclear etiology  - O2 sat noted to be 88% on RA  - DDimer 271  - CXR w/ Small left pleural effusion  - C/w incentive spirometer  - Continue tele / continuous Pulse Oximetry   - On Eliquis  - Cards following    # Post op Hypotension: Improved  - Trend BP's  - Orthos negative  - S/p IVF boluses  - Continue to monitor    # HLD:  - C/w Statin    # Depression  - C/w Lexapro    # Gi:  - Bowel regimen prn    # DVt ppx:  - Eliquis    [employer]  [phone]  
HPI: 65 y/o M with PMHx of Afib, colon Polyps, BPH s/p Urolift procedure (10 years ago), Anxiety, s/p Cystoscopy, Bladder Stone Removal with Dr. Hensley on 10/05/2023. Pt reports many years of palpitations, rare and intermittently, last 2 months occurring more frequently., saw a cardiologist, Dx: Afib. Pt denies c/p or SOB. Now s/p afib ablation    # Atrial Fibrillation  8/2 s/p afib ablation via RFV w/ vascade x3  Resume Eliquis 5 mg BID  Continue monitoring telemetry  Keep Potassium> 4.0 and Magnesium>2.0  Follow up w/ post procedure EP appt  Anticipate discharge home after IV abx complete, telemetry stable with stable vitals and labs, if site and condition remain stable    # HLD  Continue Atorvastatin 20 mg daily  DASH diet    # Hypotension  Hypotensive and diaphoretic while ambulating  Orthostatic negative  Normotensive post bolus 500 cc NS  Right groin site stable  Bedrest overnight  Cards fellow notified- at bedside  Continue to monitor overnight    Zane Mcclure AGACNP  Ext [phone]

## 2024-08-05 NOTE — PROGRESS NOTE ADULT - SUBJECTIVE AND OBJECTIVE BOX
[facility] ELECTROPHYSIOLOGY  [phone]    CHIEF COMPLAINT: Patient is a 64y old  Male who presents with a chief complaint of palpitations    HPI: 63 y/o M with PMHx of Afib, colon Polyps, BPH s/p Urolift procedure (10 years ago), Anxiety, s/p Cystoscopy, Bladder Stone Removal with Dr. Hensley on 10/05/2023. Pt reports many years of palpitations, rare and intermittently, last 2 months occurring more frequently., saw a cardiologist, Dx: Afib. Pt denies c/p or SOB. Pt evaluated by Dr. Dos Santos for a scheduled Afib ablation on 8/2/2024.    Subjective/Observations: patient seen and examined. Denies chest pain, dyspena, dizziness, palpitations, N&V, HA    Review of Systems all WNL except below indicated:    Constitutional: [ ] Fever [ ] Chills [ ] Fatigue [ ] Weight change   HEENT: [ ] Blurred vision [ ] Eye Pain [ ] Headache [ ] Runny nose [ ] Sore Throat   Respiratory: [ ] Cough [ ] Wheezing [ ] Shortness of breath  Cardiovascular: [ ] Chest Pain [ ] Palpitations [ ] MCKEON [ ] PND [ ] Orthopnea  Gastrointestinal: [ ] Abdominal Pain [ ] Diarrhea [ ] Constipation [ ] Hemorrhoids [ ] Nausea [ ] Vomiting  Genitourinary: [ ] Nocturia [ ] Dysuria [ ] Incontinence  Extremities: [ ] Swelling [ ] Joint Pain  Neurologic: [ ] Focal deficit [ ] Paresthesias [ ] Syncope  Lymphatic: [ ] Swelling [ ] Lymphadenopathy   Skin: [ ] Rash [ ] Ecchymoses [ ] Wounds [ ] Lesions  Psychiatry: [ ] Depression [ ] Suicidal/Homicidal Ideation [ ] Anxiety [ ] Sleep Disturbances  [ ] 10 point review of systems is otherwise negative except as mentioned above            [ ]Unable to obtain    PAST MEDICAL & SURGICAL HISTORY:  BPH (benign prostatic hyperplasia)    Nephrolithiasis    Bladder stone    Internal hemorrhoids    Colon polyps    H/O carpal tunnel syndrome    History of prostate surgery    Urinary bladder stone    S/P colonoscopy    MEDICATIONS  (STANDING):  atorvastatin 20 milliGRAM(s) Oral at bedtime  escitalopram 5 milliGRAM(s) Oral daily    MEDICATIONS  (PRN):    Allergies    No Known Allergies    Intolerances    Vital Signs Last 24 Hrs  T(C): 36.7 (02 Aug 2024 20:00), Max: 36.8 (02 Aug 2024 16:02)  T(F): 98 (02 Aug 2024 20:00), Max: 98.3 (02 Aug 2024 16:02)  HR: 68 (02 Aug 2024 23:00) (43 - 91)  BP: 91/57 (02 Aug 2024 23:00) (65/35 - 160/71)  BP(mean): 68 (02 Aug 2024 23:00) (45 - 102)  RR: 17 (02 Aug 2024 23:00) (16 - 20)  SpO2: 95% (02 Aug 2024 23:00) (90% - 98%)    Parameters below as of 02 Aug 2024 21:00  Patient On (Oxygen Delivery Method): room air    I&O's Summary    02 Aug 2024 07:01  -  02 Aug 2024 23:27  --------------------------------------------------------  IN: 0 mL / OUT: 800 mL / NET: -800 mL      Weight (kg): 83.9 (08-02 @ 07:56)    FOCUSED PHYSICAL EXAM:  Neuro: No apparent distress, alert and oriented times x3, appropriate affect  Pulmonary: Non-labored, breath sounds are clear bilaterally, No wheezing, rales or rhonchi  Cardiovascular: Regular, S1 and S2, No murmurs, rubs, gallops or clicks  Site: Right groin w/ vascade x3: Soft, non tender, no bleeding or hematoma. Pulses in the right lower extremity are palpable    LABS: All Labs Reviewed:                        15.0   9.35  )-----------( 208      ( 02 Aug 2024 15:32 )             43.9     02 Aug 2024 15:32    139    |  107    |  17     ----------------------------<  159    4.1     |  16     |  0.96     Ca    8.1        02 Aug 2024 15:32    RESULTS:    ECG:   Ventricular Rate 55 BPM    Atrial Rate 55 BPM    P-R Interval 172 ms    QRS Duration 94 ms    Q-T Interval 408 ms    QTC Calculation(Bazett) 390 ms    P Axis 39 degrees    R Axis -40 degrees    T Axis -3 degrees    Diagnosis Line SINUS BRADYCARDIA  LEFT AXIS DEVIATION  ABNORMAL ECG  Confirmed by LUZ Esparza Moussa ([id number]) on 8/2/2024 9:44:00 PM    ECHO: 8/2/24  CONCLUSIONS:   1. Left ventricular systolic function is normal.   2. No pericardial effusion seen.    6/25/24:  CONCLUSIONS:   1. Left ventricular cavity is normal in size. Left ventricular wall thickness is normal. Left ventricular systolic function is normal with an ejection fraction of 57 % by 3D. There are no regional wall motion abnormalities seen.   2. Normalleft ventricular diastolic function, with normal filling pressure.   3. Normal right ventricular cavity size, with normal wall thickness, and normal systolic function. Tricuspid annular plane systolic excursion (TAPSE) is 2.1 cm (normal >=1.7 cm).   4. No pericardial effusion seen.  
DATE OF SERVICE: 08-05-24 @ 14:06    Patient is a 64y old  Male who presents with a chief complaint of AF (04 Aug 2024 17:47)      INTERVAL HISTORY: Feels much better.     REVIEW OF SYSTEMS:  CONSTITUTIONAL: No weakness  EYES/ENT: No visual changes;  No throat pain   NECK: No pain or stiffness  RESPIRATORY: No cough, wheezing; No shortness of breath  CARDIOVASCULAR: No chest pain or palpitations  GASTROINTESTINAL: No abdominal  pain. No nausea, vomiting, or hematemesis  GENITOURINARY: No dysuria, frequency or hematuria  NEUROLOGICAL: No stroke like symptoms  SKIN: No rashes    TELEMETRY Personally reviewed: SR 60-70  	  MEDICATIONS:        PHYSICAL EXAM:  T(C): 37.2 (08-05-24 @ 11:18), Max: 37.4 (08-04-24 @ 20:49)  HR: 66 (08-05-24 @ 11:18) (61 - 66)  BP: 128/78 (08-05-24 @ 11:18) (127/81 - 131/83)  RR: 18 (08-05-24 @ 11:18) (18 - 24)  SpO2: 94% (08-05-24 @ 11:18) (92% - 95%)  Wt(kg): --  I&O's Summary    04 Aug 2024 07:01  -  05 Aug 2024 07:00  --------------------------------------------------------  IN: 0 mL / OUT: 1400 mL / NET: -1400 mL    05 Aug 2024 07:01  -  05 Aug 2024 14:06  --------------------------------------------------------  IN: 240 mL / OUT: 400 mL / NET: -160 mL          Appearance: In no distress	  HEENT:    PERRL, EOMI	  Cardiovascular:  S1 S2, No JVD  Respiratory: Lungs clear to auscultation	  Gastrointestinal:  Soft, Non-tender, + BS	  Vascularature:  No edema of LE  Psychiatric: Appropriate affect   Neuro: no acute focal deficits                               13.3   9.75  )-----------( 176      ( 05 Aug 2024 07:20 )             41.0     08-05    139  |  105  |  13  ----------------------------<  97  4.0   |  20<L>  |  0.88    Ca    8.9      05 Aug 2024 07:20  Mg     2.3     08-05          Labs personally reviewed      ASSESSMENT/PLAN: 	    63 y/o M with PMHx of Afib, colon Polyps, BPH s/p Urolift procedure (10 years ago), Anxiety, s/p Cystoscopy, Bladder Stone Removal with Dr. Hensley on 10/05/2023. Pt reports many years of palpitations, rare and intermittently, last 2 months occurring more frequently., saw a cardiologist, Dx: Afib. Pt denies c/p or SOB. Now s/p afib ablation    1. Atrial Fibrillation  8/2 s/p afib ablation via RFV     Eliquis 5 mg BID  Follow up w/ post procedure EP appt    2. HLD  Continue Atorvastatin 20 mg daily     3. Hypotension  Now resolved    4. Hypoxia  - unclear etiology  - ?mild overload  - now auto diuresing   - Now saturating wnl on room air and denies SOB    No further inpatient cardiac workup.        Bailey Murrieta, GARY-BRAD Grimaldo DO [facility]  Cardiovascular Medicine  [address], [address]  Available through call or text on [employer]  Office: [phone]  
24H hour events: Pt feeling well. Denies dizziness, lightheadedness, cough. Using incentive spirometer.     MEDICATIONS:  apixaban 5 milliGRAM(s) Oral every 12 hours        acetaminophen     Tablet .. 650 milliGRAM(s) Oral every 6 hours PRN  escitalopram 5 milliGRAM(s) Oral daily    polyethylene glycol 3350 17 Gram(s) Oral two times a day    atorvastatin 20 milliGRAM(s) Oral at bedtime    sodium chloride 0.9%. 1000 milliLiter(s) IV Continuous <Continuous>      REVIEW OF SYSTEMS:  See HPI, otherwise ROS negative.    PHYSICAL EXAM:  T(C): 37.2 (08-05-24 @ 11:18), Max: 37.4 (08-04-24 @ 20:49)  HR: 66 (08-05-24 @ 11:18) (61 - 66)  BP: 128/78 (08-05-24 @ 11:18) (127/81 - 131/83)  RR: 18 (08-05-24 @ 11:18) (18 - 24)  SpO2: 94% (08-05-24 @ 11:18) (92% - 95%)  Wt(kg): --  I&O's Summary    04 Aug 2024 07:01  -  05 Aug 2024 07:00  --------------------------------------------------------  IN: 0 mL / OUT: 1400 mL / NET: -1400 mL    05 Aug 2024 07:01  -  05 Aug 2024 14:24  --------------------------------------------------------  IN: 240 mL / OUT: 400 mL / NET: -160 mL        Appearance: Alert. NAD	  HEENT:   NC/AT	  Cardiovascular: +S1S2 RRR no m/g/r  Respiratory: CTA B/L	  Psychiatry: A & O x 3, Mood & affect appropriate  Gastrointestinal:  Soft	  Skin: No rashes	  Neurologic: Non-focal  Extremities: No edema BLE      LABS:	 	    CBC Full  -  ( 05 Aug 2024 07:20 )  WBC Count : 9.75 K/uL  Hemoglobin : 13.3 g/dL  Hematocrit : 41.0 %  Platelet Count - Automated : 176 K/uL  Mean Cell Volume : 88.4 fl  Mean Cell Hemoglobin : 28.7 pg  Mean Cell Hemoglobin Concentration : 32.4 gm/dL  Auto Neutrophil # : x  Auto Lymphocyte # : x  Auto Monocyte # : x  Auto Eosinophil # : x  Auto Basophil # : x  Auto Neutrophil % : x  Auto Lymphocyte % : x  Auto Monocyte % : x  Auto Eosinophil % : x  Auto Basophil % : x    08-05    139  |  105  |  13  ----------------------------<  97  4.0   |  20<L>  |  0.88  08-04    136  |  105  |  16  ----------------------------<  117<H>  3.8   |  20<L>  |  0.92    Ca    8.9      05 Aug 2024 07:20  Ca    8.7      04 Aug 2024 05:50  Mg     2.3     08-05  Mg     2.0     08-04      TELEMETRY: NSR 60s  	    < from: TTE W or WO Ultrasound Enhancing Agent (08.02.24 @ 14:30) >  CONCLUSIONS:      1. Left ventricular systolic function is normal.   2. No pericardial effusion seen.    < end of copied text >    	  ASSESSMENT/PLAN: 	    
SUBJECTIVE / OVERNIGHT EVENTS:      Patient seen and examined at bedside. No events noted overnight. Resting comfortably in bed. Hypotension noted yesterday      --------------------------------------------------------------------------------------------  LABS:                        12.7   12.79 )-----------( 160      ( 04 Aug 2024 05:50 )             38.8     08-03    140  |  107  |  21  ----------------------------<  117<H>  3.8   |  21<L>  |  1.01    Ca    8.2<L>      03 Aug 2024 08:31  Mg     1.7     08-03        CAPILLARY BLOOD GLUCOSE            Urinalysis Basic - ( 03 Aug 2024 08:31 )    Color: x / Appearance: x / SG: x / pH: x  Gluc: 117 mg/dL / Ketone: x  / Bili: x / Urobili: x   Blood: x / Protein: x / Nitrite: x   Leuk Esterase: x / RBC: x / WBC x   Sq Epi: x / Non Sq Epi: x / Bacteria: x        RADIOLOGY & ADDITIONAL TESTS:     Imaging Personally Reviewed:  [x] YES  [ ] NO    Consultant(s) Notes Reviewed:  [x] YES  [ ] NO    MEDICATIONS  (STANDING):  apixaban 5 milliGRAM(s) Oral every 12 hours  atorvastatin 20 milliGRAM(s) Oral at bedtime  escitalopram 5 milliGRAM(s) Oral daily  sodium chloride 0.9%. 1000 milliLiter(s) (75 mL/Hr) IV Continuous <Continuous>    MEDICATIONS  (PRN):  acetaminophen     Tablet .. 650 milliGRAM(s) Oral every 6 hours PRN Mild Pain (1 - 3)      Care Discussed with Consultants/Other Providers [x] YES  [ ] NO    Vital Signs Last 24 Hrs  T(C): 37.1 (04 Aug 2024 05:11), Max: 37.7 (03 Aug 2024 15:34)  T(F): 98.8 (04 Aug 2024 05:11), Max: 99.9 (03 Aug 2024 15:34)  HR: 73 (04 Aug 2024 05:11) (62 - 77)  BP: 129/82 (04 Aug 2024 05:11) (85/52 - 129/82)  BP(mean): 74 (03 Aug 2024 15:34) (64 - 74)  RR: 19 (04 Aug 2024 05:11) (15 - 22)  SpO2: 94% (04 Aug 2024 05:11) (91% - 95%)    Parameters below as of 04 Aug 2024 05:11  Patient On (Oxygen Delivery Method): nasal cannula      I&O's Summary    02 Aug 2024 07:01  -  03 Aug 2024 07:00  --------------------------------------------------------  IN: 0 mL / OUT: 1750 mL / NET: -1750 mL    03 Aug 2024 07:01  -  04 Aug 2024 06:16  --------------------------------------------------------  IN: 480 mL / OUT: 300 mL / NET: 180 mL        PHYSICAL EXAM:  GENERAL: NAD, well-developed, comfortable  HEAD:  Atraumatic, Normocephalic  EYES: EOMI, PERRLA, conjunctiva and sclera clear  NECK: Supple, No JVD  CHEST/LUNG: Clear to auscultation bilaterally; No wheeze  HEART: Regular rate and rhythm; No murmurs, rubs, or gallops  ABDOMEN: Soft, Nontender, Nondistended; Bowel sounds present  NEURO: AAOx3, no focal weakness, 5/5 b/l extremity strength, b/l knee no arthritis, no effusion   EXTREMITIES:  2+ Peripheral Pulses, No clubbing, cyanosis, or edema  SKIN: No rashes or lesions    
SUBJECTIVE / OVERNIGHT EVENTS:    Patient seen and examined at bedside. Hypoxia noted. Resting in the chair, doing his incentive spirometer         --------------------------------------------------------------------------------------------  LABS:                        13.3   9.75  )-----------( 176      ( 05 Aug 2024 07:20 )             41.0     08-05    139  |  105  |  13  ----------------------------<  97  4.0   |  20<L>  |  0.88    Ca    8.9      05 Aug 2024 07:20  Mg     2.3     08-05        CAPILLARY BLOOD GLUCOSE            Urinalysis Basic - ( 05 Aug 2024 07:20 )    Color: x / Appearance: x / SG: x / pH: x  Gluc: 97 mg/dL / Ketone: x  / Bili: x / Urobili: x   Blood: x / Protein: x / Nitrite: x   Leuk Esterase: x / RBC: x / WBC x   Sq Epi: x / Non Sq Epi: x / Bacteria: x        RADIOLOGY & ADDITIONAL TESTS: < from: Xray Chest 1 View- PORTABLE-Urgent (Xray Chest 1 View- PORTABLE-Urgent .) (08.04.24 @ 14:46) >  IMPRESSION:  Small left pleural effusion    < end of copied text >      Imaging Personally Reviewed:  [x] YES  [ ] NO    Consultant(s) Notes Reviewed:  [x] YES  [ ] NO    MEDICATIONS  (STANDING):  apixaban 5 milliGRAM(s) Oral every 12 hours  atorvastatin 20 milliGRAM(s) Oral at bedtime  escitalopram 5 milliGRAM(s) Oral daily  polyethylene glycol 3350 17 Gram(s) Oral two times a day  sodium chloride 0.9%. 1000 milliLiter(s) (75 mL/Hr) IV Continuous <Continuous>    MEDICATIONS  (PRN):  acetaminophen     Tablet .. 650 milliGRAM(s) Oral every 6 hours PRN Mild Pain (1 - 3)      Care Discussed with Consultants/Other Providers [x] YES  [ ] NO    Vital Signs Last 24 Hrs  T(C): 37.2 (05 Aug 2024 04:16), Max: 37.4 (04 Aug 2024 20:49)  T(F): 99 (05 Aug 2024 04:16), Max: 99.4 (04 Aug 2024 20:49)  HR: 61 (05 Aug 2024 04:16) (61 - 66)  BP: 131/62 (05 Aug 2024 04:16) (127/81 - 131/84)  BP(mean): --  RR: 18 (05 Aug 2024 04:16) (18 - 24)  SpO2: 95% (05 Aug 2024 04:16) (88% - 95%)    Parameters below as of 05 Aug 2024 04:16  Patient On (Oxygen Delivery Method): room air      I&O's Summary    04 Aug 2024 07:01  -  05 Aug 2024 07:00  --------------------------------------------------------  IN: 0 mL / OUT: 1400 mL / NET: -1400 mL          PHYSICAL EXAM:  GENERAL: NAD, well-developed, comfortable  HEAD:  Atraumatic, Normocephalic  EYES: EOMI, PERRLA, conjunctiva and sclera clear  NECK: Supple, No JVD  CHEST/LUNG: Clear to auscultation bilaterally; No wheeze  HEART: Regular rate and rhythm; No murmurs, rubs, or gallops  ABDOMEN: Soft, Nontender, Nondistended; Bowel sounds present  NEURO: AAOx3, no focal weakness, 5/5 b/l extremity strength, b/l knee no arthritis, no effusion   EXTREMITIES:  2+ Peripheral Pulses, No clubbing, cyanosis, or edema  SKIN: No rashes or lesions

## 2024-08-05 NOTE — PHARMACOTHERAPY INTERVENTION NOTE - COMMENTS
Counseled patient on the following inpatient/discharge medications names (brand/generic), indication, and possible side effects:    Eliquis 5mg one tablet twice daily     Patient was provided with a medication card for their new medication. Patient questions and concerns were answered and addressed. Patient demonstrated understanding.   Patient's daughter already picked up medications @ [facility]. Pt aware of copay and is fine with it.     Gerardo Resendiz, Pharmacy Intern  [facility] Class of 2025    Counseled patient on the following inpatient/discharge medications names (brand/generic), indication, and possible side effects:    Eliquis 5m,  one tablet twice daily for AF    Patient was provided with a medication card for their new medication. Patient questions and concerns were answered and addressed. Patient demonstrated understanding.   Patient's daughter already picked up medications @ [facility]. Pt aware of copay and is fine with it.     Gerardo Resendiz, Pharmacy Intern  [facility] Class of 2025

## 2024-08-06 ENCOUNTER — NON-APPOINTMENT (OUTPATIENT)
Age: 64
End: 2024-08-06

## 2024-08-19 ENCOUNTER — NON-APPOINTMENT (OUTPATIENT)
Age: 64
End: 2024-08-19

## 2024-08-19 ENCOUNTER — APPOINTMENT (OUTPATIENT)
Dept: CARDIOLOGY | Facility: CLINIC | Age: 64
End: 2024-08-19
Payer: COMMERCIAL

## 2024-08-19 VITALS
WEIGHT: 185 LBS | BODY MASS INDEX: 27.4 KG/M2 | DIASTOLIC BLOOD PRESSURE: 89 MMHG | HEIGHT: 69 IN | SYSTOLIC BLOOD PRESSURE: 120 MMHG | OXYGEN SATURATION: 96 % | HEART RATE: 86 BPM

## 2024-08-19 DIAGNOSIS — I48.0 PAROXYSMAL ATRIAL FIBRILLATION: ICD-10-CM

## 2024-08-19 DIAGNOSIS — Z86.79 OTHER SPECIFIED POSTPROCEDURAL STATES: ICD-10-CM

## 2024-08-19 DIAGNOSIS — Z98.890 OTHER SPECIFIED POSTPROCEDURAL STATES: ICD-10-CM

## 2024-08-19 PROCEDURE — 99213 OFFICE O/P EST LOW 20 MIN: CPT | Mod: 25

## 2024-08-19 PROCEDURE — G2211 COMPLEX E/M VISIT ADD ON: CPT | Mod: NC

## 2024-08-19 PROCEDURE — 93000 ELECTROCARDIOGRAM COMPLETE: CPT

## 2024-08-19 RX ORDER — APIXABAN 2.5 MG/1
2.5 TABLET, FILM COATED ORAL
Refills: 0 | Status: ACTIVE | COMMUNITY

## 2024-08-19 NOTE — HISTORY OF PRESENT ILLNESS
[FreeTextEntry1] : 64 year old man with PAF, s/p AF ablation 8/2/2024.  Post procedure patient developed hypotension most likely vasovagal.  Since discharge, he has felt well with minimal palpitations no SOB or chest discomfort.

## 2024-08-19 NOTE — DISCUSSION/SUMMARY
[EKG obtained to assist in diagnosis and management of assessed problem(s)] : EKG obtained to assist in diagnosis and management of assessed problem(s) [FreeTextEntry1] : 64 year old patient stable s/p afib ablation.  Continue Eliquis.  EP follow up in a few weeks.

## 2024-10-15 ENCOUNTER — APPOINTMENT (OUTPATIENT)
Dept: ELECTROPHYSIOLOGY | Facility: CLINIC | Age: 64
End: 2024-10-15
Payer: COMMERCIAL

## 2024-10-15 ENCOUNTER — NON-APPOINTMENT (OUTPATIENT)
Age: 64
End: 2024-10-15

## 2024-10-15 VITALS
HEART RATE: 72 BPM | DIASTOLIC BLOOD PRESSURE: 78 MMHG | BODY MASS INDEX: 27.32 KG/M2 | OXYGEN SATURATION: 98 % | WEIGHT: 185 LBS | SYSTOLIC BLOOD PRESSURE: 130 MMHG

## 2024-10-15 DIAGNOSIS — I48.0 PAROXYSMAL ATRIAL FIBRILLATION: ICD-10-CM

## 2024-10-15 PROCEDURE — 93000 ELECTROCARDIOGRAM COMPLETE: CPT

## 2024-10-15 PROCEDURE — 99213 OFFICE O/P EST LOW 20 MIN: CPT | Mod: 25

## 2025-01-21 ENCOUNTER — NON-APPOINTMENT (OUTPATIENT)
Age: 65
End: 2025-01-21

## 2025-01-21 ENCOUNTER — APPOINTMENT (OUTPATIENT)
Dept: ELECTROPHYSIOLOGY | Facility: CLINIC | Age: 65
End: 2025-01-21
Payer: COMMERCIAL

## 2025-01-21 VITALS
HEIGHT: 69 IN | HEART RATE: 87 BPM | BODY MASS INDEX: 28.14 KG/M2 | SYSTOLIC BLOOD PRESSURE: 130 MMHG | WEIGHT: 190 LBS | DIASTOLIC BLOOD PRESSURE: 92 MMHG | OXYGEN SATURATION: 96 %

## 2025-01-21 DIAGNOSIS — I48.0 PAROXYSMAL ATRIAL FIBRILLATION: ICD-10-CM

## 2025-01-21 PROCEDURE — 93000 ELECTROCARDIOGRAM COMPLETE: CPT

## 2025-01-21 PROCEDURE — 99213 OFFICE O/P EST LOW 20 MIN: CPT | Mod: 25

## 2025-02-03 ENCOUNTER — APPOINTMENT (OUTPATIENT)
Dept: UROLOGY | Facility: CLINIC | Age: 65
End: 2025-02-03
Payer: COMMERCIAL

## 2025-02-03 ENCOUNTER — NON-APPOINTMENT (OUTPATIENT)
Age: 65
End: 2025-02-03

## 2025-02-03 VITALS
WEIGHT: 190 LBS | HEART RATE: 88 BPM | DIASTOLIC BLOOD PRESSURE: 83 MMHG | HEIGHT: 69 IN | TEMPERATURE: 97.6 F | RESPIRATION RATE: 17 BRPM | SYSTOLIC BLOOD PRESSURE: 127 MMHG | BODY MASS INDEX: 28.14 KG/M2

## 2025-02-03 DIAGNOSIS — R33.9 RETENTION OF URINE, UNSPECIFIED: ICD-10-CM

## 2025-02-03 DIAGNOSIS — N13.8 BENIGN PROSTATIC HYPERPLASIA WITH LOWER URINARY TRACT SYMPMS: ICD-10-CM

## 2025-02-03 DIAGNOSIS — N40.1 BENIGN PROSTATIC HYPERPLASIA WITH LOWER URINARY TRACT SYMPMS: ICD-10-CM

## 2025-02-03 PROCEDURE — 99213 OFFICE O/P EST LOW 20 MIN: CPT

## 2025-04-24 ENCOUNTER — APPOINTMENT (OUTPATIENT)
Dept: DERMATOLOGY | Facility: CLINIC | Age: 65
End: 2025-04-24
Payer: COMMERCIAL

## 2025-04-24 ENCOUNTER — NON-APPOINTMENT (OUTPATIENT)
Age: 65
End: 2025-04-24

## 2025-04-24 VITALS — HEIGHT: 69 IN | WEIGHT: 190 LBS | BODY MASS INDEX: 28.14 KG/M2

## 2025-04-24 DIAGNOSIS — L81.4 OTHER MELANIN HYPERPIGMENTATION: ICD-10-CM

## 2025-04-24 PROCEDURE — 99203 OFFICE O/P NEW LOW 30 MIN: CPT

## 2025-06-19 ENCOUNTER — APPOINTMENT (OUTPATIENT)
Dept: DERMATOLOGY | Facility: CLINIC | Age: 65
End: 2025-06-19

## (undated) DEVICE — SYR ASEPTO

## (undated) DEVICE — GOWN TRIMAX LG

## (undated) DEVICE — POSITIONER FOAM EGG CRATE ULNAR 2PCS (PINK)

## (undated) DEVICE — SOL IRR BAG H2O 3000ML

## (undated) DEVICE — POSITIONER FOAM HEADREST (PINK)

## (undated) DEVICE — WARMING BLANKET UPPER ADULT

## (undated) DEVICE — SOL IRR BAG NS 0.9% 3000ML

## (undated) DEVICE — VENODYNE/SCD SLEEVE CALF LARGE

## (undated) DEVICE — GLV 7 PROTEXIS (WHITE)

## (undated) DEVICE — GLV 8 PROTEXIS (WHITE)

## (undated) DEVICE — DRAPE EQUIPMENT BANDED BAG 30 X 30" (SHOWER CAP)

## (undated) DEVICE — SOL IRR POUR H2O 1500ML

## (undated) DEVICE — TUBING RANGER FLUID IRRIGATION SET DISP

## (undated) DEVICE — FOLEY HOLDER STATLOCK 2 WAY ADULT

## (undated) DEVICE — BOSTON SCIENTIFC PUMPING SYSTEM SAPS SINGLE ACTION 10CC

## (undated) DEVICE — GLV 7.5 PROTEXIS (WHITE)

## (undated) DEVICE — PRESSURE INFUSOR BAG 3000ML

## (undated) DEVICE — PACK CYSTO

## (undated) DEVICE — ACMI SELF-SEALING SEAL UP TO 7FR

## (undated) DEVICE — IRR BULB PATHFINDER + 10"

## (undated) DEVICE — TUBING SUCTION 20FT

## (undated) DEVICE — GLV 6.5 PROTEXIS (WHITE)